# Patient Record
Sex: FEMALE | Race: WHITE | NOT HISPANIC OR LATINO | Employment: UNEMPLOYED | URBAN - METROPOLITAN AREA
[De-identification: names, ages, dates, MRNs, and addresses within clinical notes are randomized per-mention and may not be internally consistent; named-entity substitution may affect disease eponyms.]

---

## 2021-09-05 ENCOUNTER — HOSPITAL ENCOUNTER (EMERGENCY)
Facility: HOSPITAL | Age: 51
Discharge: HOME | End: 2021-09-05
Attending: EMERGENCY MEDICINE
Payer: COMMERCIAL

## 2021-09-05 ENCOUNTER — APPOINTMENT (EMERGENCY)
Dept: CT IMAGING | Facility: HOSPITAL | Age: 51
End: 2021-09-05
Payer: COMMERCIAL

## 2021-09-05 VITALS
OXYGEN SATURATION: 98 % | SYSTOLIC BLOOD PRESSURE: 203 MMHG | HEART RATE: 86 BPM | RESPIRATION RATE: 18 BRPM | DIASTOLIC BLOOD PRESSURE: 99 MMHG

## 2021-09-05 DIAGNOSIS — R51.9 ACUTE NONINTRACTABLE HEADACHE, UNSPECIFIED HEADACHE TYPE: Primary | ICD-10-CM

## 2021-09-05 DIAGNOSIS — K08.89 TOOTHACHE: ICD-10-CM

## 2021-09-05 PROCEDURE — 70450 CT HEAD/BRAIN W/O DYE: CPT

## 2021-09-05 PROCEDURE — 96375 TX/PRO/DX INJ NEW DRUG ADDON: CPT

## 2021-09-05 PROCEDURE — 99284 EMERGENCY DEPT VISIT MOD MDM: CPT | Performed by: PHYSICIAN ASSISTANT

## 2021-09-05 PROCEDURE — 99283 EMERGENCY DEPT VISIT LOW MDM: CPT

## 2021-09-05 PROCEDURE — 96374 THER/PROPH/DIAG INJ IV PUSH: CPT

## 2021-09-05 PROCEDURE — G1004 CDSM NDSC: HCPCS

## 2021-09-05 RX ORDER — DIPHENHYDRAMINE HYDROCHLORIDE 50 MG/ML
25 INJECTION INTRAMUSCULAR; INTRAVENOUS ONCE
Status: COMPLETED | OUTPATIENT
Start: 2021-09-05 | End: 2021-09-05

## 2021-09-05 RX ORDER — METOCLOPRAMIDE HYDROCHLORIDE 5 MG/ML
10 INJECTION INTRAMUSCULAR; INTRAVENOUS ONCE
Status: COMPLETED | OUTPATIENT
Start: 2021-09-05 | End: 2021-09-05

## 2021-09-05 RX ORDER — CLINDAMYCIN HYDROCHLORIDE 150 MG/1
450 CAPSULE ORAL EVERY 8 HOURS SCHEDULED
Qty: 63 CAPSULE | Refills: 0 | Status: SHIPPED | OUTPATIENT
Start: 2021-09-05 | End: 2021-09-12

## 2021-09-05 RX ORDER — KETOROLAC TROMETHAMINE 30 MG/ML
15 INJECTION, SOLUTION INTRAMUSCULAR; INTRAVENOUS ONCE
Status: COMPLETED | OUTPATIENT
Start: 2021-09-05 | End: 2021-09-05

## 2021-09-05 RX ORDER — NAPROXEN 500 MG/1
500 TABLET ORAL 2 TIMES DAILY WITH MEALS
Qty: 30 TABLET | Refills: 0 | Status: SHIPPED | OUTPATIENT
Start: 2021-09-05

## 2021-09-05 RX ADMIN — DIPHENHYDRAMINE HYDROCHLORIDE 25 MG: 50 INJECTION, SOLUTION INTRAMUSCULAR; INTRAVENOUS at 05:59

## 2021-09-05 RX ADMIN — METOCLOPRAMIDE HYDROCHLORIDE 10 MG: 5 INJECTION INTRAMUSCULAR; INTRAVENOUS at 06:04

## 2021-09-05 RX ADMIN — KETOROLAC TROMETHAMINE 15 MG: 30 INJECTION, SOLUTION INTRAMUSCULAR; INTRAVENOUS at 06:03

## 2021-09-05 NOTE — ED PROVIDER NOTES
History  Chief Complaint   Patient presents with    Headache     Pt presents to ED with left sided lower jaw, left ear and left sided head pain  Pt also reports 1 episode of vomitting today  Patient is a 57-year-old female with no significant past medical history presents the emergency department for evaluation of a left-sided headache that has been ongoing for the past 2-3 days  Patient also reporting 1 episode of vomiting this morning  Patient states that the pain initially began in the left side of her head, pain has since radiated to the left lower jaw as well as left ear  Patient does have history of migraines  Patient states that she has bad teeth and does not know if her symptoms are caused by a possible tooth infection  She does not have any vision changes, confusion, facial droop, dysarthria, unilateral weakness, numbness, tingling, abdominal pain, chest pain, difficulty breathing  No other complaints at this time  None       No past medical history on file  No past surgical history on file  No family history on file  I have reviewed and agree with the history as documented  E-Cigarette/Vaping     E-Cigarette/Vaping Substances     Social History     Tobacco Use    Smoking status: Never Smoker    Smokeless tobacco: Never Used   Substance Use Topics    Alcohol use: Not on file    Drug use: Not on file       Review of Systems   Constitutional: Negative for chills, diaphoresis and fever  HENT: Positive for ear pain (Left-sided)  Negative for congestion, facial swelling, nosebleeds, sore throat and voice change  Eyes: Negative for pain and redness  Respiratory: Negative for cough, choking, chest tightness, shortness of breath and stridor  Cardiovascular: Negative for chest pain and palpitations  Gastrointestinal: Positive for vomiting  Negative for abdominal pain, diarrhea and nausea     Genitourinary: Negative for difficulty urinating, dysuria, flank pain, hematuria, vaginal bleeding and vaginal discharge  Musculoskeletal: Negative for arthralgias, back pain, myalgias, neck pain and neck stiffness  Skin: Negative for color change and rash  Neurological: Positive for headaches  Negative for dizziness, syncope, facial asymmetry, weakness, light-headedness and numbness  Psychiatric/Behavioral: Negative for confusion and suicidal ideas  All other systems reviewed and are negative  Physical Exam  Physical Exam  Vitals reviewed  Constitutional:       General: She is not in acute distress  Appearance: Normal appearance  She is not ill-appearing, toxic-appearing or diaphoretic  HENT:      Head: Normocephalic and atraumatic  Right Ear: External ear normal       Left Ear: External ear normal       Nose: Nose normal  No congestion or rhinorrhea  Mouth/Throat:      Mouth: Mucous membranes are moist       Dentition: Abnormal dentition (Poor dentition, multiple missing teeth  Multiple dental caries  )  Dental caries present  Pharynx: Oropharynx is clear  No oropharyngeal exudate or posterior oropharyngeal erythema  Eyes:      General: No visual field deficit or scleral icterus  Right eye: No discharge  Left eye: No discharge  Extraocular Movements: Extraocular movements intact  Conjunctiva/sclera: Conjunctivae normal       Pupils: Pupils are equal, round, and reactive to light  Cardiovascular:      Rate and Rhythm: Normal rate and regular rhythm  Pulses: Normal pulses  Heart sounds: Normal heart sounds  No murmur heard  No friction rub  No gallop  Pulmonary:      Effort: Pulmonary effort is normal  No respiratory distress  Breath sounds: Normal breath sounds  No stridor  No wheezing, rhonchi or rales  Abdominal:      General: Abdomen is flat  Palpations: Abdomen is soft  Tenderness: There is no abdominal tenderness  There is no guarding or rebound     Musculoskeletal:      Cervical back: Normal range of motion and neck supple  Right lower leg: No edema  Left lower leg: No edema  Skin:     General: Skin is warm and dry  Capillary Refill: Capillary refill takes less than 2 seconds  Neurological:      General: No focal deficit present  Mental Status: She is alert and oriented to person, place, and time  GCS: GCS eye subscore is 4  GCS verbal subscore is 5  GCS motor subscore is 6  Cranial Nerves: Cranial nerves are intact  No cranial nerve deficit, dysarthria or facial asymmetry  Sensory: Sensation is intact  No sensory deficit  Motor: Motor function is intact  No weakness, tremor, seizure activity or pronator drift  Coordination: Coordination is intact  Coordination normal  Finger-Nose-Finger Test and Heel to [facility] Test normal  Rapid alternating movements normal       Gait: Gait is intact  Gait normal    Psychiatric:         Mood and Affect: Mood normal          Behavior: Behavior normal          Vital Signs  ED Triage Vitals   Temp Pulse Respirations Blood Pressure SpO2   -- 09/05/21 0535 09/05/21 0535 09/05/21 0535 09/05/21 0535    86 18 (!) 203/99 98 %      Temp src Heart Rate Source Patient Position - Orthostatic VS BP Location FiO2 (%)   -- 09/05/21 0535 09/05/21 0535 09/05/21 0535 --    Monitor Sitting Right arm       Pain Score       09/05/21 0603       6           Vitals:    09/05/21 0535   BP: (!) 203/99   Pulse: 86   Patient Position - Orthostatic VS: Sitting         Visual Acuity      ED Medications  Medications   metoclopramide (REGLAN) injection 10 mg (10 mg Intravenous Given 9/5/21 0604)   diphenhydrAMINE (BENADRYL) injection 25 mg (25 mg Intravenous Given 9/5/21 0559)   ketorolac (TORADOL) injection 15 mg (15 mg Intravenous Given 9/5/21 0603)       Diagnostic Studies  Results Reviewed     None                 CT head without contrast   Final Result by Adamaris Clements MD (09/05 4267)      No acute intracranial abnormality  Workstation performed: [id number]                    Procedures  Procedures         ED Course                                           MDM  Number of Diagnoses or Management Options  Acute nonintractable headache, unspecified headache type  Toothache  Diagnosis management comments: Patient is a 57-year-old female with no significant past medical history presents the emergency department for evaluation of a left-sided headache that has been ongoing for the past 2-3 days  Patient also reporting 1 episode of vomiting this morning  Patient states that the pain initially began in the left side of her head, pain has since radiated to the left lower jaw as well as left ear  Patient does have history of migraines  Patient states that she has bad teeth and does not know if her symptoms are caused by a possible tooth infection  She does not have any vision changes, confusion, facial droop, dysarthria, unilateral weakness, numbness, tingling, abdominal pain, chest pain, difficulty breathing  No other complaints at this time  Patient appears comfortable in bed  She is not any acute distress  Vital signs remarkable for hypertension at 203/99  Remainder of vital signs unremarkable  Physical exam remarkable for poor dentition with multiple missing teeth and multiple dental caries  There is also dental tenderness present  Remainder of physical exam unremarkable  Heart is regular rate and rhythm  Lungs are clear to auscultation bilaterally  Abdomen is soft and nontender  Neurologic exam benign  CT head did not reveal any acute intracranial abnormality  Patient felt much improved after migraine cocktail  Patient discharged home with instructions to follow-up with her primary care provider  She was also given prescription for clindamycin for possible tooth infection  Patient given prescription for naproxen  She was given very strict instructions on when to return to the emergency department    Patient stable at this time  Amount and/or Complexity of Data Reviewed  Tests in the radiology section of CPT®: ordered and reviewed    Patient Progress  Patient progress: stable      Disposition  Final diagnoses:   Acute nonintractable headache, unspecified headache type   Toothache     Time reflects when diagnosis was documented in both MDM as applicable and the Disposition within this note     Time User Action Codes Description Comment    9/5/2021  6:53 AM Briana Haqueter Add [R51 9] Acute nonintractable headache, unspecified headache type     9/5/2021  6:53 AM Briana Cleve Add [K08 89] Toothache       ED Disposition     ED Disposition Condition Date/Time Comment    Discharge Stable Sun Sep 5, 2021  6:53 AM Jarvis Mancilla discharge to home/self care  Follow-up Information     Follow up With Specialties Details Why Contact Info Additional [address] [facility] Emergency Department Emergency Medicine Go to  If symptoms worsen [address] Emergency Department, [address] [place] [address], [address] [facility] Adult and [address] [address]  Schedule an appointment as soon as possible for a visit  for follow up Charmaine Rader 118  [phone]           Discharge Medication List as of 9/5/2021  6:56 AM      START taking these medications    Details   clindamycin (CLEOCIN) 150 mg capsule Take 3 capsules (450 mg total) by mouth every 8 (eight) hours for 7 days, Starting Sun 9/5/2021, Until Sun 9/12/2021, Print      naproxen (NAPROSYN) 500 mg tablet Take 1 tablet (500 mg total) by mouth 2 (two) times a day with meals, Starting Sun 9/5/2021, Print           No discharge procedures on file      PDMP Review     None          ED Provider  Electronically Signed by           Darrius Lyn PA-C  09/05/21 6150

## 2022-10-25 ENCOUNTER — HOSPITAL ENCOUNTER (EMERGENCY)
Facility: HOSPITAL | Age: 52
Discharge: HOME | End: 2022-10-25
Attending: EMERGENCY MEDICINE
Payer: COMMERCIAL

## 2022-10-25 VITALS
WEIGHT: 200 LBS | RESPIRATION RATE: 22 BRPM | BODY MASS INDEX: 32.14 KG/M2 | HEART RATE: 67 BPM | SYSTOLIC BLOOD PRESSURE: 126 MMHG | DIASTOLIC BLOOD PRESSURE: 72 MMHG | HEIGHT: 66 IN | OXYGEN SATURATION: 99 % | TEMPERATURE: 98 F

## 2022-10-25 DIAGNOSIS — R53.81 MALAISE: Primary | ICD-10-CM

## 2022-10-25 LAB
ANION GAP SERPL CALCULATED.3IONS-SCNC: 10 MMOL/L (ref 4–13)
ATRIAL RATE: 64 BPM
BASOPHILS # BLD AUTO: 0.04 THOUSANDS/ÂΜL (ref 0–0.1)
BASOPHILS NFR BLD AUTO: 0 % (ref 0–1)
BUN SERPL-MCNC: 15 MG/DL (ref 5–25)
CALCIUM SERPL-MCNC: 9.3 MG/DL (ref 8.3–10.1)
CARDIAC TROPONIN I PNL SERPL HS: 3 NG/L
CHLORIDE SERPL-SCNC: 105 MMOL/L (ref 96–108)
CO2 SERPL-SCNC: 25 MMOL/L (ref 21–32)
CREAT SERPL-MCNC: 0.92 MG/DL (ref 0.6–1.3)
EOSINOPHIL # BLD AUTO: 0.06 THOUSAND/ÂΜL (ref 0–0.61)
EOSINOPHIL NFR BLD AUTO: 1 % (ref 0–6)
ERYTHROCYTE [DISTWIDTH] IN BLOOD BY AUTOMATED COUNT: 13.4 % (ref 11.6–15.1)
FLUAV RNA RESP QL NAA+PROBE: NEGATIVE
FLUBV RNA RESP QL NAA+PROBE: NEGATIVE
GFR SERPL CREATININE-BSD FRML MDRD: 71 ML/MIN/1.73SQ M
GLUCOSE SERPL-MCNC: 126 MG/DL (ref 65–140)
HCT VFR BLD AUTO: 42.6 % (ref 34.8–46.1)
HGB BLD-MCNC: 14.1 G/DL (ref 11.5–15.4)
IMM GRANULOCYTES # BLD AUTO: 0.03 THOUSAND/UL (ref 0–0.2)
IMM GRANULOCYTES NFR BLD AUTO: 0 % (ref 0–2)
LYMPHOCYTES # BLD AUTO: 2.96 THOUSANDS/ÂΜL (ref 0.6–4.47)
LYMPHOCYTES NFR BLD AUTO: 23 % (ref 14–44)
MAGNESIUM SERPL-MCNC: 2 MG/DL (ref 1.6–2.6)
MCH RBC QN AUTO: 30.4 PG (ref 26.8–34.3)
MCHC RBC AUTO-ENTMCNC: 33.1 G/DL (ref 31.4–37.4)
MCV RBC AUTO: 92 FL (ref 82–98)
MONOCYTES # BLD AUTO: 0.52 THOUSAND/ÂΜL (ref 0.17–1.22)
MONOCYTES NFR BLD AUTO: 4 % (ref 4–12)
NEUTROPHILS # BLD AUTO: 9.17 THOUSANDS/ÂΜL (ref 1.85–7.62)
NEUTS SEG NFR BLD AUTO: 72 % (ref 43–75)
NRBC BLD AUTO-RTO: 0 /100 WBCS
P AXIS: 65 DEGREES
PLATELET # BLD AUTO: 350 THOUSANDS/UL (ref 149–390)
PMV BLD AUTO: 9.1 FL (ref 8.9–12.7)
POTASSIUM SERPL-SCNC: 4.3 MMOL/L (ref 3.5–5.3)
PR INTERVAL: 174 MS
QRS AXIS: 75 DEGREES
QRSD INTERVAL: 88 MS
QT INTERVAL: 392 MS
QTC INTERVAL: 404 MS
RBC # BLD AUTO: 4.64 MILLION/UL (ref 3.81–5.12)
RSV RNA RESP QL NAA+PROBE: NEGATIVE
SARS-COV-2 RNA RESP QL NAA+PROBE: NEGATIVE
SODIUM SERPL-SCNC: 140 MMOL/L (ref 135–147)
T WAVE AXIS: 74 DEGREES
VENTRICULAR RATE: 64 BPM
WBC # BLD AUTO: 12.78 THOUSAND/UL (ref 4.31–10.16)

## 2022-10-25 PROCEDURE — 0241U HB NFCT DS VIR RESP RNA 4 TRGT: CPT | Performed by: SURGERY

## 2022-10-25 PROCEDURE — 85025 COMPLETE CBC W/AUTO DIFF WBC: CPT | Performed by: SURGERY

## 2022-10-25 PROCEDURE — 36415 COLL VENOUS BLD VENIPUNCTURE: CPT | Performed by: SURGERY

## 2022-10-25 PROCEDURE — 93010 ELECTROCARDIOGRAM REPORT: CPT | Performed by: INTERNAL MEDICINE

## 2022-10-25 PROCEDURE — 93005 ELECTROCARDIOGRAM TRACING: CPT

## 2022-10-25 PROCEDURE — 80048 BASIC METABOLIC PNL TOTAL CA: CPT | Performed by: SURGERY

## 2022-10-25 PROCEDURE — 83735 ASSAY OF MAGNESIUM: CPT | Performed by: SURGERY

## 2022-10-25 PROCEDURE — 84484 ASSAY OF TROPONIN QUANT: CPT | Performed by: SURGERY

## 2022-10-25 PROCEDURE — 99284 EMERGENCY DEPT VISIT MOD MDM: CPT | Performed by: SURGERY

## 2022-10-25 RX ORDER — SODIUM CHLORIDE 9 MG/ML
3 INJECTION INTRAVENOUS
Status: DISCONTINUED | OUTPATIENT
Start: 2022-10-25 | End: 2022-10-25 | Stop reason: HOSPADM

## 2022-10-25 RX ADMIN — SODIUM CHLORIDE 1000 ML: 0.9 INJECTION, SOLUTION INTRAVENOUS at 03:02

## 2022-10-25 NOTE — ED PROVIDER NOTES
History  Chief Complaint   Patient presents with   • Weakness - Generalized     Pt state's she "doesn't feel great", with arm/leg pain along with shakiness  Pt states daughter took BP at home and it was 146/100  Maurice Schilling is a 46 y o  female with no pertinent past medical history presenting today with malaise  Patient reports that she woke up feeling malaise and generalized fatigue   Patient daughter took blood pressure which was 146/100 prompting the patient to become increasingly anxious and presents to the emergency department for evaluation  Patient has never had symptoms like this in the past   She denies any history of hypertension is not currently on any medications  Denies any chest pain, shortness of breath, lightheadedness, dizziness, nausea, vomiting, diarrhea, fevers, chills, numbness, tingling, weakness in the extremities  No further complaints at this time            Prior to Admission Medications   Prescriptions Last Dose Informant Patient Reported? Taking?   naproxen (NAPROSYN) 500 mg tablet   No No   Sig: Take 1 tablet (500 mg total) by mouth 2 (two) times a day with meals      Facility-Administered Medications: None       History reviewed  No pertinent past medical history  History reviewed  No pertinent surgical history  History reviewed  No pertinent family history  I have reviewed and agree with the history as documented  E-Cigarette/Vaping     E-Cigarette/Vaping Substances     Social History     Tobacco Use   • Smoking status: Never Smoker   • Smokeless tobacco: Never Used       Review of Systems   Constitutional: Positive for fatigue  Negative for chills and fever  HENT: Negative for ear pain and sore throat  Eyes: Negative for pain and visual disturbance  Respiratory: Negative for cough and shortness of breath  Cardiovascular: Negative for chest pain and palpitations  Gastrointestinal: Negative for abdominal pain and vomiting     Genitourinary: Negative for dysuria and hematuria  Musculoskeletal: Negative for arthralgias and back pain  Skin: Negative for color change and rash  Neurological: Negative for seizures, syncope and weakness (Triage note reported weakness but patient symptoms consistent with fatigue  )  All other systems reviewed and are negative  Physical Exam  Physical Exam  Constitutional:       Appearance: She is normal weight  HENT:      Head: Normocephalic and atraumatic  Right Ear: External ear normal       Left Ear: External ear normal       Nose: Nose normal  No congestion or rhinorrhea  Mouth/Throat:      Mouth: Mucous membranes are moist       Pharynx: Oropharynx is clear  No oropharyngeal exudate or posterior oropharyngeal erythema  Eyes:      Pupils: Pupils are equal, round, and reactive to light  Cardiovascular:      Rate and Rhythm: Normal rate and regular rhythm  Pulmonary:      Effort: Pulmonary effort is normal  No respiratory distress  Breath sounds: Normal breath sounds  No wheezing or rhonchi  Abdominal:      General: Abdomen is flat  Bowel sounds are normal  There is no distension  Tenderness: There is no abdominal tenderness  Musculoskeletal:         General: No swelling or tenderness  Normal range of motion  Cervical back: Normal range of motion and neck supple  No rigidity or tenderness  Skin:     General: Skin is warm  Capillary Refill: Capillary refill takes less than 2 seconds  Neurological:      General: No focal deficit present  Mental Status: She is alert and oriented to person, place, and time           Vital Signs  ED Triage Vitals [10/25/22 0221]   Temperature Pulse Respirations Blood Pressure SpO2   98 °F (36 7 °C) 78 18 (!) 190/103 97 %      Temp Source Heart Rate Source Patient Position - Orthostatic VS BP Location FiO2 (%)   Oral Monitor Sitting Left arm --      Pain Score       --           Vitals:    10/25/22 0221 10/25/22 0230 10/25/22 0330 10/25/22 0430 BP: (!) 190/103 154/83 160/75 126/72   Pulse: 78 65 64 67   Patient Position - Orthostatic VS: Sitting Sitting Lying Lying         Visual Acuity  Visual Acuity    Flowsheet Row Most Recent Value   L Pupil Size (mm) 3   R Pupil Size (mm) 3          ED Medications  Medications   sodium chloride (PF) 0 9 % injection 3 mL (has no administration in time range)   sodium chloride 0 9 % bolus 1,000 mL (0 mL Intravenous Stopped 10/25/22 0522)       Diagnostic Studies  Results Reviewed     Procedure Component Value Units Date/Time    FLU/RSV/COVID - if FLU/RSV clinically relevant [[id number]]  (Normal) Collected: 10/25/22 0351    Lab Status: Final result Specimen: Nasopharyngeal Swab Updated: 10/25/22 0445     SARS-CoV-2 Negative     INFLUENZA A PCR Negative     INFLUENZA B PCR Negative     RSV PCR Negative    Narrative:      FOR PEDIATRIC PATIENTS - copy/paste COVID Guidelines URL to browser: [email or web address]  [employer]    SARS-CoV-2 assay is a Nucleic Acid Amplification assay intended for the  qualitative detection of nucleic acid from SARS-CoV-2 in nasopharyngeal  swabs  Results are for the presumptive identification of SARS-CoV-2 RNA  Positive results are indicative of infection with SARS-CoV-2, the virus  causing COVID-19, but do not rule out bacterial infection or co-infection  with other viruses  Laboratories within the [place] and its  territories are required to report all positive results to the appropriate  public health authorities  Negative results do not preclude SARS-CoV-2  infection and should not be used as the sole basis for treatment or other  patient management decisions  Negative results must be combined with  clinical observations, patient history, and epidemiological information  This test has not been FDA cleared or approved  This test has been authorized by FDA under an Emergency Use Authorization  (EUA)   This test is only authorized for the duration of time the  declaration that circumstances exist justifying the authorization of the  emergency use of an in vitro diagnostic tests for detection of SARS-CoV-2  virus and/or diagnosis of COVID-19 infection under section 564(b)(1) of  the Act, 21 U  S C  566BRR-8(W)(0), unless the authorization is terminated  or revoked sooner  The test has been validated but independent review by FDA  and CLIA is pending  Test performed using [employer] GeneXpert: This RT-PCR assay targets N2,  a region unique to SARS-CoV-2  A conserved region in the E-gene was chosen  for pan-Sarbecovirus detection which includes SARS-CoV-2  According to CMS-2020-01-R, this platform meets the definition of high-throughput technology      HS Troponin I 4hr [[id number]]     Lab Status: No result Specimen: Blood     HS Troponin I 2hr [[id number]]     Lab Status: No result Specimen: Blood     HS Troponin 0hr (reflex protocol) [[id number]]  (Normal) Collected: 10/25/22 0301    Lab Status: Final result Specimen: Blood from Arm, Right Updated: 10/25/22 0336     hs TnI 0hr 3 ng/L     Basic metabolic panel [[id number]] Collected: 10/25/22 0301    Lab Status: Final result Specimen: Blood from Arm, Right Updated: 10/25/22 0323     Sodium 140 mmol/L      Potassium 4 3 mmol/L      Chloride 105 mmol/L      CO2 25 mmol/L      ANION GAP 10 mmol/L      BUN 15 mg/dL      Creatinine 0 92 mg/dL      Glucose 126 mg/dL      Calcium 9 3 mg/dL      eGFR 71 ml/min/1 73sq m     Narrative:      Asiya guidelines for Chronic Kidney Disease (CKD):   •  Stage 1 with normal or high GFR (GFR > 90 mL/min/1 73 square meters)  •  Stage 2 Mild CKD (GFR = 60-89 mL/min/1 73 square meters)  •  Stage 3A Moderate CKD (GFR = 45-59 mL/min/1 73 square meters)  •  Stage 3B Moderate CKD (GFR = 30-44 mL/min/1 73 square meters)  •  Stage 4 Severe CKD (GFR = 15-29 mL/min/1 73 square meters)  •  Stage 5 End Stage CKD (GFR <15 mL/min/1 73 square meters)  Note: GFR calculation is accurate only with a steady state creatinine    Magnesium [[id number]]  (Normal) Collected: 10/25/22 0301    Lab Status: Final result Specimen: Blood from Arm, Right Updated: 10/25/22 0323     Magnesium 2 0 mg/dL     CBC and differential [[id number]]  (Abnormal) Collected: 10/25/22 0301    Lab Status: Final result Specimen: Blood from Arm, Right Updated: 10/25/22 0308     WBC 12 78 Thousand/uL      RBC 4 64 Million/uL      Hemoglobin 14 1 g/dL      Hematocrit 42 6 %      MCV 92 fL      MCH 30 4 pg      MCHC 33 1 g/dL      RDW 13 4 %      MPV 9 1 fL      Platelets 369 Thousands/uL      nRBC 0 /100 WBCs      Neutrophils Relative 72 %      Immat GRANS % 0 %      Lymphocytes Relative 23 %      Monocytes Relative 4 %      Eosinophils Relative 1 %      Basophils Relative 0 %      Neutrophils Absolute 9 17 Thousands/µL      Immature Grans Absolute 0 03 Thousand/uL      Lymphocytes Absolute 2 96 Thousands/µL      Monocytes Absolute 0 52 Thousand/µL      Eosinophils Absolute 0 06 Thousand/µL      Basophils Absolute 0 04 Thousands/µL                  No orders to display              Procedures  Procedures         ED Course             HEART Risk Score    Flowsheet Row Most Recent Value   Heart Score Risk Calculator    History 1 Filed at: 10/25/2022 0500   ECG 0 Filed at: 10/25/2022 0500   Age 1 Filed at: 10/25/2022 0500   Risk Factors 1 Filed at: 10/25/2022 0500   Troponin 0 Filed at: 10/25/2022 0500   HEART Score 3 Filed at: 10/25/2022 0500                                      MDM  Number of Diagnoses or Management Options  Malaise: minor  Diagnosis management comments: Cassi Nicholson is a 46 y o  female with no pertinent past medical history presenting today with malaise  Patient feels significantly improved after IV fluids  Blood pressure reduced to 126/72  Patient remained afebrile  Vital signs stable  Discussed findings of lab work with elevated white count  COVID negative    Patient requesting to go home   Strict return criteria were discussed with the patient at bedside  She demonstrated understanding  Amount and/or Complexity of Data Reviewed  Clinical lab tests: ordered and reviewed  Tests in the radiology section of CPT®: ordered and reviewed  Tests in the medicine section of CPT®: reviewed and ordered    Risk of Complications, Morbidity, and/or Mortality  Presenting problems: moderate  Diagnostic procedures: moderate  Management options: moderate    Patient Progress  Patient progress: stable      Disposition  Final diagnoses:   Malaise     Time reflects when diagnosis was documented in both MDM as applicable and the Disposition within this note     Time User Action Codes Description Comment    10/25/2022  5:00 AM Fred Acuña Add [R53 81] [facility]       ED Disposition     ED Disposition   Discharge    Condition   Stable    Date/Time   Tue Oct 25, 2022  5:00 AM    Comment   Haider Morgan discharge to home/self care  Follow-up Information     Follow up With Specialties Details Why Contact Info Additional [address] [facility] Emergency Department Emergency Medicine Go to  If symptoms worsen [address] Emergency Department, [address], [address] [place] [address]  Call today To schedule an appointment for follow-up with a primary care provider within the next 1 week regarding elevated blood pressure  [phone]             Discharge Medication List as of 10/25/2022  5:26 AM      CONTINUE these medications which have NOT CHANGED    Details   naproxen (NAPROSYN) 500 mg tablet Take 1 tablet (500 mg total) by mouth 2 (two) times a day with meals, Starting Sun 9/5/2021, Print             No discharge procedures on file      PDMP Review     None          ED Provider  Electronically Signed by           Amira Arauz PA-C  10/25/22 8756

## 2022-10-26 ENCOUNTER — APPOINTMENT (OUTPATIENT)
Dept: LAB | Facility: CLINIC | Age: 52
End: 2022-10-26

## 2022-10-26 ENCOUNTER — OFFICE VISIT (OUTPATIENT)
Dept: FAMILY MEDICINE CLINIC | Facility: CLINIC | Age: 52
End: 2022-10-26
Payer: COMMERCIAL

## 2022-10-26 VITALS
SYSTOLIC BLOOD PRESSURE: 148 MMHG | HEART RATE: 87 BPM | WEIGHT: 195.2 LBS | DIASTOLIC BLOOD PRESSURE: 106 MMHG | HEIGHT: 66 IN | TEMPERATURE: 97.8 F | BODY MASS INDEX: 31.37 KG/M2 | OXYGEN SATURATION: 95 %

## 2022-10-26 DIAGNOSIS — Z12.11 SCREENING FOR COLON CANCER: ICD-10-CM

## 2022-10-26 DIAGNOSIS — Z23 FLU VACCINE NEED: ICD-10-CM

## 2022-10-26 DIAGNOSIS — R73.09 ELEVATED GLUCOSE: ICD-10-CM

## 2022-10-26 DIAGNOSIS — Z13.220 SCREENING, LIPID: ICD-10-CM

## 2022-10-26 DIAGNOSIS — I10 PRIMARY HYPERTENSION: Primary | ICD-10-CM

## 2022-10-26 DIAGNOSIS — I10 PRIMARY HYPERTENSION: ICD-10-CM

## 2022-10-26 DIAGNOSIS — Z76.89 ENCOUNTER TO ESTABLISH CARE: ICD-10-CM

## 2022-10-26 DIAGNOSIS — Z12.31 SCREENING MAMMOGRAM FOR BREAST CANCER: ICD-10-CM

## 2022-10-26 DIAGNOSIS — E66.09 CLASS 1 OBESITY DUE TO EXCESS CALORIES WITH SERIOUS COMORBIDITY AND BODY MASS INDEX (BMI) OF 31.0 TO 31.9 IN ADULT: ICD-10-CM

## 2022-10-26 PROBLEM — E66.811 CLASS 1 OBESITY DUE TO EXCESS CALORIES WITH SERIOUS COMORBIDITY AND BODY MASS INDEX (BMI) OF 31.0 TO 31.9 IN ADULT: Status: ACTIVE | Noted: 2022-10-26

## 2022-10-26 PROBLEM — G40.909 NONINTRACTABLE EPILEPSY WITHOUT STATUS EPILEPTICUS, UNSPECIFIED EPILEPSY TYPE (HCC): Status: RESOLVED | Noted: 2022-10-26 | Resolved: 2022-10-26

## 2022-10-26 LAB
CHOLEST SERPL-MCNC: 273 MG/DL
EST. AVERAGE GLUCOSE BLD GHB EST-MCNC: 120 MG/DL
HBA1C MFR BLD: 5.8 %
HDLC SERPL-MCNC: 51 MG/DL
LDLC SERPL CALC-MCNC: 187 MG/DL (ref 0–100)
TRIGL SERPL-MCNC: 173 MG/DL

## 2022-10-26 PROCEDURE — 90471 IMMUNIZATION ADMIN: CPT | Performed by: FAMILY MEDICINE

## 2022-10-26 PROCEDURE — 99204 OFFICE O/P NEW MOD 45 MIN: CPT | Performed by: FAMILY MEDICINE

## 2022-10-26 PROCEDURE — 90682 RIV4 VACC RECOMBINANT DNA IM: CPT | Performed by: FAMILY MEDICINE

## 2022-10-26 RX ORDER — HYDROCHLOROTHIAZIDE 25 MG/1
25 TABLET ORAL DAILY
Qty: 30 TABLET | Refills: 2 | Status: SHIPPED | OUTPATIENT
Start: 2022-10-26

## 2022-10-26 NOTE — PROGRESS NOTES
Assessment/Plan:         Problem List Items Addressed This Visit        Cardiovascular and Mediastinum    Primary hypertension - Primary     Will start HCTZ 25 mg daily  Discussed common side effects  Will monitor with home BP cuff and return in 2 weeks for recheck  Relevant Medications    hydrochlorothiazide (HYDRODIURIL) 25 mg tablet    Other Relevant Orders    Lipid Panel with Direct LDL reflex       Other    Class 1 obesity due to excess calories with serious comorbidity and body mass index (BMI) of 31 0 to 31 9 in adult     BMI Counseling: Body mass index is 31 51 kg/m²  The BMI is above normal  Nutrition recommendations include decreasing overall calorie intake  Exercise recommendations include exercising 3-5 times per week  Other Visit Diagnoses     Flu vaccine need        Relevant Orders    FLUBLOK: influenza vaccine, quadrivalent, recombinant, PF, 0 5 mL (Completed)    Screening mammogram for breast cancer        Relevant Orders    Mammo screening bilateral w 3d & cad    Screening for colon cancer        Relevant Orders    Cologuard    Encounter to establish care        Elevated glucose        Relevant Orders    HEMOGLOBIN A1C W/ EAG ESTIMATION    Screening, lipid        Relevant Orders    Lipid Panel with Direct LDL reflex            Subjective:      Patient ID: João Calvo is a 46 y o  female  46year old here to establish care  No significant PMH  She was recently in the ER for hypertension  At home 155/110  At the /103  She has no known history of hypertension  For me her /110  She denies chest pain, SOB, headaches  She had labs done in the ER which we reviewed  Glucose 126, not fasting  Obesity - no exercise, diet is normal per patient  Hypertension  This is a new problem  The current episode started today  The problem is unchanged  The problem is resistant  Associated symptoms include orthopnea   Pertinent negatives include no blurred vision, chest pain, headaches, palpitations, peripheral edema, PND or shortness of breath  There are no associated agents to hypertension  Risk factors for coronary artery disease include family history, obesity, post-menopausal state and smoking/tobacco exposure  Compliance problems include exercise  The following portions of the patient's history were reviewed and updated as appropriate:   Past Medical History:  She has no past medical history on file ,  _______________________________________________________________________  Medical Problems:  does not have any pertinent problems on file ,  _______________________________________________________________________  Past Surgical History:   has no past surgical history on file ,  _______________________________________________________________________  Family History:  family history includes COPD in her father; Heart disease in her father; Hypertension in her mother ,  _______________________________________________________________________  Social History:   reports that she quit smoking about 13 years ago  Her smoking use included cigarettes  She has a 25 00 pack-year smoking history  She has never used smokeless tobacco  She reports current alcohol use  She reports previous drug use ,  _______________________________________________________________________  Allergies:  is allergic to penicillins and erythromycin     _______________________________________________________________________  Current Outpatient Medications   Medication Sig Dispense Refill   • hydrochlorothiazide (HYDRODIURIL) 25 mg tablet Take 1 tablet (25 mg total) by mouth daily 30 tablet 2   • naproxen (NAPROSYN) 500 mg tablet Take 1 tablet (500 mg total) by mouth 2 (two) times a day with meals (Patient taking differently: Take 500 mg by mouth 2 (two) times a day with meals As needed) 30 tablet 0     No current facility-administered medications for this visit  _______________________________________________________________________  Review of Systems   Constitutional: Negative for activity change, appetite change, chills, fatigue, fever and unexpected weight change  HENT: Negative for congestion, ear discharge, ear pain, postnasal drip, sinus pressure and sore throat  Eyes: Negative for blurred vision, discharge and visual disturbance  Respiratory: Negative for cough, shortness of breath and wheezing  Cardiovascular: Positive for orthopnea  Negative for chest pain, palpitations, leg swelling and PND  Gastrointestinal: Negative for abdominal pain, constipation, diarrhea, nausea and vomiting  Endocrine: Negative for cold intolerance, heat intolerance, polydipsia and polyuria  Genitourinary: Negative for difficulty urinating and frequency  Musculoskeletal: Negative for arthralgias, back pain, joint swelling and myalgias  Skin: Negative for rash  Neurological: Negative for dizziness, weakness, light-headedness, numbness and headaches  Hematological: Negative for adenopathy  Psychiatric/Behavioral: Negative for behavioral problems, confusion, dysphoric mood, sleep disturbance and suicidal ideas  The patient is not nervous/anxious  Objective:  Vitals:    10/26/22 1226   BP: (!) 148/106   BP Location: Left arm   Patient Position: Sitting   Pulse: 87   Temp: 97 8 °F (36 6 °C)   TempSrc: Temporal   SpO2: 95%   Weight: 88 5 kg (195 lb 3 2 oz)   Height: 5' 6" (1 676 m)     Body mass index is 31 51 kg/m²  Physical Exam  Vitals and nursing note reviewed  Constitutional:       General: She is not in acute distress  Appearance: Normal appearance  She is obese  She is not ill-appearing, toxic-appearing or diaphoretic  HENT:      Head: Normocephalic and atraumatic  Right Ear: External ear normal       Left Ear: External ear normal    Cardiovascular:      Rate and Rhythm: Normal rate and regular rhythm        Heart sounds: No murmur heard     No friction rub  Pulmonary:      Effort: Pulmonary effort is normal  No respiratory distress  Breath sounds: Normal breath sounds  No stridor  No wheezing, rhonchi or rales  Musculoskeletal:         General: No swelling  Right lower leg: No edema  Left lower leg: No edema  Neurological:      General: No focal deficit present  Mental Status: She is alert  Mental status is at baseline  Psychiatric:         Attention and Perception: Attention normal          Mood and Affect: Mood normal          Speech: Speech normal          Behavior: Behavior normal          Thought Content: Thought content normal          Judgment: Judgment normal            BMI Counseling: Body mass index is 31 51 kg/m²  The BMI is above normal  Nutrition recommendations include decreasing overall calorie intake  Exercise recommendations include exercising 3-5 times per week

## 2022-10-26 NOTE — ASSESSMENT & PLAN NOTE
BMI Counseling: Body mass index is 31 51 kg/m²  The BMI is above normal  Nutrition recommendations include decreasing overall calorie intake  Exercise recommendations include exercising 3-5 times per week

## 2022-10-26 NOTE — ASSESSMENT & PLAN NOTE
Will start HCTZ 25 mg daily  Discussed common side effects  Will monitor with home BP cuff and return in 2 weeks for recheck

## 2022-11-10 ENCOUNTER — OFFICE VISIT (OUTPATIENT)
Dept: FAMILY MEDICINE CLINIC | Facility: CLINIC | Age: 52
End: 2022-11-10

## 2022-11-10 VITALS
SYSTOLIC BLOOD PRESSURE: 140 MMHG | HEIGHT: 66 IN | OXYGEN SATURATION: 98 % | WEIGHT: 195 LBS | DIASTOLIC BLOOD PRESSURE: 80 MMHG | BODY MASS INDEX: 31.34 KG/M2 | HEART RATE: 74 BPM | TEMPERATURE: 97.6 F

## 2022-11-10 DIAGNOSIS — E78.2 MIXED HYPERLIPIDEMIA: ICD-10-CM

## 2022-11-10 DIAGNOSIS — I10 PRIMARY HYPERTENSION: Primary | ICD-10-CM

## 2022-11-10 DIAGNOSIS — R73.03 PREDIABETES: ICD-10-CM

## 2022-11-10 RX ORDER — LISINOPRIL 10 MG/1
10 TABLET ORAL DAILY
Qty: 30 TABLET | Refills: 2 | Status: SHIPPED | OUTPATIENT
Start: 2022-11-10

## 2022-11-10 NOTE — PROGRESS NOTES
Assessment/Plan:         Problem List Items Addressed This Visit        Cardiovascular and Mediastinum    Primary hypertension - Primary     BP still elevated- will add Lisinopril 10 mg, continue HCTZ  Monitor at home  Relevant Medications    lisinopril (ZESTRIL) 10 mg tablet       Other    Mixed hyperlipidemia     Reduce fats in diet, will repeat in 3 months         Relevant Orders    Lipid Panel with Direct LDL reflex    Comprehensive metabolic panel    Prediabetes     Work on low carb diet, exercise, weight loss         Relevant Orders    Hemoglobin A1C            Subjective:      Patient ID: Piter Muniz is a 46 y o  female  Here for a BP check  She was started on HCTZ 25 mg daily  Had some lightheadedness at first, but now tolerating well  BP still elevated- 140/90s  Also had labs done which we reveiwed  Her cholesterol is elevated  A1c 5 8% prediabetic range  She admits her diet could be better  She plans to work on diet and exercise  The following portions of the patient's history were reviewed and updated as appropriate:    Past Medical History:  She has no past medical history on file ,  _______________________________________________________________________  Medical Problems:  does not have any pertinent problems on file ,  _______________________________________________________________________  Past Surgical History:   has no past surgical history on file ,  _______________________________________________________________________  Family History:  family history includes COPD in her father; Heart disease in her father; Hypertension in her mother ,  _______________________________________________________________________  Social History:   reports that she quit smoking about 13 years ago  Her smoking use included cigarettes  She has a 25 00 pack-year smoking history  She has never used smokeless tobacco  She reports current alcohol use   She reports previous drug use ,  _______________________________________________________________________  Allergies:  is allergic to penicillins, erythromycin, and salicylates     _______________________________________________________________________  Current Outpatient Medications   Medication Sig Dispense Refill   • lisinopril (ZESTRIL) 10 mg tablet Take 1 tablet (10 mg total) by mouth daily 30 tablet 2   • hydrochlorothiazide (HYDRODIURIL) 25 mg tablet Take 1 tablet (25 mg total) by mouth daily 30 tablet 2   • naproxen (NAPROSYN) 500 mg tablet Take 1 tablet (500 mg total) by mouth 2 (two) times a day with meals (Patient taking differently: Take 500 mg by mouth 2 (two) times a day with meals As needed) 30 tablet 0     No current facility-administered medications for this visit      _______________________________________________________________________  Review of Systems   Constitutional: Negative for activity change  Respiratory: Negative for chest tightness and shortness of breath  Cardiovascular: Negative for chest pain and leg swelling  Neurological: Negative for headaches  Psychiatric/Behavioral: Negative for dysphoric mood  The patient is not nervous/anxious  Objective:  Vitals:    11/10/22 1301   BP: 140/80   BP Location: Left arm   Patient Position: Sitting   Cuff Size: Large   Pulse: 74   Temp: 97 6 °F (36 4 °C)   SpO2: 98%   Weight: 88 5 kg (195 lb)   Height: 5' 6" (1 676 m)     Body mass index is 31 47 kg/m²  Physical Exam  Vitals and nursing note reviewed  Constitutional:       General: She is not in acute distress  Appearance: Normal appearance  She is obese  She is not ill-appearing, toxic-appearing or diaphoretic  HENT:      Head: Normocephalic and atraumatic  Right Ear: External ear normal       Left Ear: External ear normal    Cardiovascular:      Rate and Rhythm: Normal rate and regular rhythm  Heart sounds: No murmur heard  No friction rub     Pulmonary:      Effort: Pulmonary effort is normal  No respiratory distress  Breath sounds: Normal breath sounds  No stridor  No wheezing, rhonchi or rales  Musculoskeletal:         General: No swelling  Right lower leg: No edema  Left lower leg: No edema  Neurological:      General: No focal deficit present  Mental Status: She is alert  Mental status is at baseline  Psychiatric:         Attention and Perception: Attention normal          Mood and Affect: Mood normal          Speech: Speech normal          Behavior: Behavior normal          Thought Content:  Thought content normal          Judgment: Judgment normal

## 2023-01-05 ENCOUNTER — OFFICE VISIT (OUTPATIENT)
Dept: FAMILY MEDICINE CLINIC | Facility: CLINIC | Age: 53
End: 2023-01-05

## 2023-01-05 VITALS
WEIGHT: 198 LBS | OXYGEN SATURATION: 97 % | HEART RATE: 72 BPM | HEIGHT: 66 IN | DIASTOLIC BLOOD PRESSURE: 84 MMHG | BODY MASS INDEX: 31.82 KG/M2 | SYSTOLIC BLOOD PRESSURE: 138 MMHG | TEMPERATURE: 97.8 F

## 2023-01-05 DIAGNOSIS — Z01.419 WELL WOMAN EXAM WITH ROUTINE GYNECOLOGICAL EXAM: ICD-10-CM

## 2023-01-05 DIAGNOSIS — Z00.00 ANNUAL PHYSICAL EXAM: Primary | ICD-10-CM

## 2023-01-05 NOTE — PROGRESS NOTES
[id number] [address] [place] [address]    NAME: Kelsey Al  AGE: 45 y o  SEX: female  : 1970     DATE: 2023     Assessment and Plan:     Problem List Items Addressed This Visit    None  Visit Diagnoses     Annual physical exam    -  Primary    Well woman exam with routine gynecological exam        Relevant Orders    Liquid-based pap, screening          Immunizations and preventive care screenings were discussed with patient today  Appropriate education was printed on patient's after visit summary  Counseling:  Dental Health: discussed importance of regular tooth brushing, flossing, and dental visits  Exercise: the importance of regular exercise/physical activity was discussed  Recommend exercise 3-5 times per week for at least 30 minutes  Pap done today  Return in 3 months with labs          Return in about 3 months (around 2023) for Review Labs  Chief Complaint:     Chief Complaint   Patient presents with   • Gynecologic Exam     Unsure of last exam  Denies any abn results in the past        History of Present Illness:     Adult Annual Physical   Patient here for a comprehensive physical exam  The patient reports problems - due for pap smear  [id number]  No vaginal discharge or bleeding  Post-menopausal    Mammogram ordered    Diet and Physical Activity  Diet/Nutrition: well balanced diet  Exercise: no formal exercise  Depression Screening  PHQ-2/9 Depression Screening         General Health  Hearing: no issues  Vision: goes for regular eye exams and wears glasses  Dental: regular dental visits  /GYN Health  Patient is: postmenopausal     Review of Systems:     Review of Systems   Constitutional: Negative for activity change  Respiratory: Negative for chest tightness and shortness of breath  Cardiovascular: Negative for chest pain and leg swelling     Genitourinary: Negative for difficulty urinating, dyspareunia, dysuria, frequency, genital sores, menstrual problem, pelvic pain, vaginal bleeding, vaginal discharge and vaginal pain  Neurological: Negative for headaches  Psychiatric/Behavioral: Negative for dysphoric mood  The patient is not nervous/anxious  Past Medical History:     No past medical history on file  Past Surgical History:     No past surgical history on file  Social History:     Social History     Socioeconomic History   • Marital status: /Civil Union     Spouse name: Not on file   • Number of children: Not on file   • Years of education: Not on file   • Highest education level: Not on file   Occupational History   • Not on file   Tobacco Use   • Smoking status: Former     Packs/day:  00     Years: 25 00     Pack years: 25 00     Types: Cigarettes     Quit date:      Years since quittin 0   • Smokeless tobacco: Never   Substance and Sexual Activity   • Alcohol use:  Yes   • Drug use: Not Currently   • Sexual activity: Not on file   Other Topics Concern   • Not on file   Social History Narrative   • Not on file     Social Determinants of Health     Financial Resource Strain: Not on file   Food Insecurity: Not on file   Transportation Needs: Not on file   Physical Activity: Not on file   Stress: Not on file   Social Connections: Not on file   Intimate Partner Violence: Not on file   Housing Stability: Not on file      Family History:     Family History   Problem Relation Age of Onset   • Hypertension Mother    • Heart disease Father    • COPD Father       Current Medications:     Current Outpatient Medications   Medication Sig Dispense Refill   • hydrochlorothiazide (HYDRODIURIL) 25 mg tablet Take 1 tablet (25 mg total) by mouth daily 30 tablet 2   • lisinopril (ZESTRIL) 10 mg tablet Take 1 tablet (10 mg total) by mouth daily 30 tablet 2   • naproxen (NAPROSYN) 500 mg tablet Take 1 tablet (500 mg total) by mouth 2 (two) times a day with meals (Patient taking differently: Take 500 mg by mouth 2 (two) times a day with meals As needed) 30 tablet 0     No current facility-administered medications for this visit  Allergies: Allergies   Allergen Reactions   • Penicillins Anaphylaxis   • Erythromycin Vomiting   • Salicylates Seizures      Physical Exam:     /84 (BP Location: Left arm, Patient Position: Sitting, Cuff Size: Large)   Pulse 72   Temp 97 8 °F (36 6 °C)   Ht 5' 6" (1 676 m)   Wt 89 8 kg (198 lb)   SpO2 97%   BMI 31 96 kg/m²     Physical Exam  Vitals and nursing note reviewed  Exam conducted with a chaperone present  Constitutional:       General: She is not in acute distress  Appearance: She is well-developed  She is not diaphoretic  HENT:      Head: Normocephalic and atraumatic  Right Ear: External ear normal       Left Ear: External ear normal    Eyes:      Conjunctiva/sclera: Conjunctivae normal    Cardiovascular:      Rate and Rhythm: Normal rate and regular rhythm  Heart sounds: Normal heart sounds  No murmur heard  No friction rub  No gallop  Pulmonary:      Effort: Pulmonary effort is normal  No respiratory distress  Breath sounds: Normal breath sounds  No stridor  No wheezing or rales  Chest:      Chest wall: No mass, lacerations, deformity, swelling or tenderness  Breasts:     Right: Normal  No swelling, bleeding, inverted nipple, mass, nipple discharge, skin change or tenderness  Left: Normal  No swelling, bleeding, inverted nipple, mass, nipple discharge, skin change or tenderness  Genitourinary:     Exam position: Prone  Pubic Area: No rash  Labia:         Right: No rash, tenderness, lesion or injury  Left: No rash, tenderness, lesion or injury  Vagina: Normal  No signs of injury and foreign body  No vaginal discharge, erythema, tenderness, bleeding, lesions or prolapsed vaginal walls        Cervix: No cervical motion tenderness, discharge, friability, lesion, erythema or cervical bleeding  Uterus: Normal        Adnexa: Right adnexa normal and left adnexa normal         Right: No tenderness or fullness  Left: No tenderness or fullness  Rectum: Normal    Lymphadenopathy:      Upper Body:      Right upper body: No axillary adenopathy  Left upper body: No axillary adenopathy  Neurological:      Mental Status: She is alert  Psychiatric:         Behavior: Behavior normal          Thought Content:  Thought content normal          Judgment: Judgment normal           Michael Hammer MD  [address] [place] [address] [id number]

## 2023-01-05 NOTE — PATIENT INSTRUCTIONS

## 2023-01-09 LAB
HPV HR 12 DNA CVX QL NAA+PROBE: NEGATIVE
HPV16 DNA CVX QL NAA+PROBE: NEGATIVE
HPV18 DNA CVX QL NAA+PROBE: NEGATIVE

## 2023-01-11 LAB
LAB AP GYN PRIMARY INTERPRETATION: NORMAL
Lab: NORMAL

## 2023-01-24 DIAGNOSIS — I10 PRIMARY HYPERTENSION: ICD-10-CM

## 2023-01-24 RX ORDER — HYDROCHLOROTHIAZIDE 25 MG/1
25 TABLET ORAL DAILY
Qty: 30 TABLET | Refills: 2 | Status: SHIPPED | OUTPATIENT
Start: 2023-01-24

## 2023-02-03 DIAGNOSIS — I10 PRIMARY HYPERTENSION: ICD-10-CM

## 2023-02-03 RX ORDER — LISINOPRIL 10 MG/1
10 TABLET ORAL DAILY
Qty: 30 TABLET | Refills: 2 | Status: SHIPPED | OUTPATIENT
Start: 2023-02-03

## 2023-04-28 DIAGNOSIS — I10 PRIMARY HYPERTENSION: ICD-10-CM

## 2023-04-28 RX ORDER — LISINOPRIL 10 MG/1
10 TABLET ORAL DAILY
Qty: 30 TABLET | Refills: 0 | Status: SHIPPED | OUTPATIENT
Start: 2023-04-28

## 2023-04-28 RX ORDER — HYDROCHLOROTHIAZIDE 25 MG/1
25 TABLET ORAL DAILY
Qty: 30 TABLET | Refills: 0 | Status: SHIPPED | OUTPATIENT
Start: 2023-04-28

## 2023-05-25 DIAGNOSIS — I10 PRIMARY HYPERTENSION: ICD-10-CM

## 2023-05-25 RX ORDER — HYDROCHLOROTHIAZIDE 25 MG/1
25 TABLET ORAL DAILY
Qty: 30 TABLET | Refills: 0 | Status: SHIPPED | OUTPATIENT
Start: 2023-05-25

## 2023-06-04 DIAGNOSIS — I10 PRIMARY HYPERTENSION: ICD-10-CM

## 2023-06-05 RX ORDER — LISINOPRIL 10 MG/1
10 TABLET ORAL DAILY
Qty: 30 TABLET | Refills: 0 | Status: SHIPPED | OUTPATIENT
Start: 2023-06-05

## 2023-06-29 DIAGNOSIS — I10 PRIMARY HYPERTENSION: ICD-10-CM

## 2023-06-29 RX ORDER — HYDROCHLOROTHIAZIDE 25 MG/1
25 TABLET ORAL DAILY
Qty: 30 TABLET | Refills: 0 | Status: SHIPPED | OUTPATIENT
Start: 2023-06-29

## 2023-07-04 DIAGNOSIS — I10 PRIMARY HYPERTENSION: ICD-10-CM

## 2023-07-05 RX ORDER — LISINOPRIL 10 MG/1
10 TABLET ORAL DAILY
Qty: 30 TABLET | Refills: 0 | Status: SHIPPED | OUTPATIENT
Start: 2023-07-05

## 2023-07-17 ENCOUNTER — APPOINTMENT (OUTPATIENT)
Dept: LAB | Facility: CLINIC | Age: 53
End: 2023-07-17
Payer: COMMERCIAL

## 2023-07-17 DIAGNOSIS — E78.2 MIXED HYPERLIPIDEMIA: ICD-10-CM

## 2023-07-17 DIAGNOSIS — R73.03 PREDIABETES: ICD-10-CM

## 2023-07-17 LAB
ALBUMIN SERPL BCP-MCNC: 4 G/DL (ref 3.5–5)
ALP SERPL-CCNC: 89 U/L (ref 46–116)
ALT SERPL W P-5'-P-CCNC: 24 U/L (ref 12–78)
ANION GAP SERPL CALCULATED.3IONS-SCNC: 2 MMOL/L
AST SERPL W P-5'-P-CCNC: 13 U/L (ref 5–45)
BILIRUB SERPL-MCNC: 0.29 MG/DL (ref 0.2–1)
BUN SERPL-MCNC: 16 MG/DL (ref 5–25)
CALCIUM SERPL-MCNC: 9.2 MG/DL (ref 8.3–10.1)
CHLORIDE SERPL-SCNC: 106 MMOL/L (ref 96–108)
CHOLEST SERPL-MCNC: 238 MG/DL
CO2 SERPL-SCNC: 29 MMOL/L (ref 21–32)
CREAT SERPL-MCNC: 0.86 MG/DL (ref 0.6–1.3)
GFR SERPL CREATININE-BSD FRML MDRD: 76 ML/MIN/1.73SQ M
GLUCOSE P FAST SERPL-MCNC: 89 MG/DL (ref 65–99)
HDLC SERPL-MCNC: 51 MG/DL
LDLC SERPL CALC-MCNC: 133 MG/DL (ref 0–100)
POTASSIUM SERPL-SCNC: 4.1 MMOL/L (ref 3.5–5.3)
PROT SERPL-MCNC: 7.3 G/DL (ref 6.4–8.4)
SODIUM SERPL-SCNC: 137 MMOL/L (ref 135–147)
TRIGL SERPL-MCNC: 272 MG/DL

## 2023-07-17 PROCEDURE — 36415 COLL VENOUS BLD VENIPUNCTURE: CPT

## 2023-07-17 PROCEDURE — 80053 COMPREHEN METABOLIC PANEL: CPT

## 2023-07-17 PROCEDURE — 83036 HEMOGLOBIN GLYCOSYLATED A1C: CPT

## 2023-07-17 PROCEDURE — 80061 LIPID PANEL: CPT

## 2023-07-18 LAB
EST. AVERAGE GLUCOSE BLD GHB EST-MCNC: 123 MG/DL
HBA1C MFR BLD: 5.9 %

## 2023-07-20 ENCOUNTER — OFFICE VISIT (OUTPATIENT)
Dept: URGENT CARE | Facility: CLINIC | Age: 53
End: 2023-07-20
Payer: COMMERCIAL

## 2023-07-20 VITALS
RESPIRATION RATE: 18 BRPM | DIASTOLIC BLOOD PRESSURE: 77 MMHG | HEART RATE: 63 BPM | TEMPERATURE: 97.3 F | OXYGEN SATURATION: 97 % | SYSTOLIC BLOOD PRESSURE: 108 MMHG

## 2023-07-20 DIAGNOSIS — H93.11 TINNITUS, RIGHT EAR: Primary | ICD-10-CM

## 2023-07-20 PROCEDURE — 99213 OFFICE O/P EST LOW 20 MIN: CPT | Performed by: PHYSICIAN ASSISTANT

## 2023-07-20 NOTE — PATIENT INSTRUCTIONS
- Please take a decongestant such as Coricidin HBP and/or an antihistamine   - If symptoms persist, please see an ENT specialist.

## 2023-07-20 NOTE — PROGRESS NOTES
[facility]        NAME: Sander Griggs is a 45 y.o. female  : 1970    MRN: [id number]  DATE: 2023  TIME: 6:47 PM      Assessment and Plan     Tinnitus, right ear [H93.11]  1. Tinnitus, right ear          Note:   I suspect possible sinus congestion causing the tinnitus. Patient Instructions     Patient Instructions   - Please take a decongestant such as Coricidin HBP and/or an antihistamine   - If symptoms persist, please see an ENT specialist.                      Follow up with PCP in 3-5 days. Go to ER if symptoms worsen. Chief Complaint     Chief Complaint   Patient presents with   • Cerumen Impaction     FEELS RIGHT EAR IS CLOGGED, SINCE YESTERDAY         History of Present Illness     Patient presents with right ear clogged feeling and ringing x today. Review of Systems     Review of Systems   Constitutional: Negative for chills, fatigue and fever. HENT: Negative for congestion, ear discharge, ear pain, postnasal drip, rhinorrhea, sinus pressure, sinus pain and sore throat. Eyes: Negative for pain and visual disturbance. Respiratory: Negative for cough, chest tightness and shortness of breath. Cardiovascular: Negative for chest pain and palpitations. Gastrointestinal: Negative for abdominal pain, diarrhea, nausea and vomiting. Genitourinary: Negative for dysuria and hematuria. Musculoskeletal: Negative for arthralgias, back pain and myalgias. Skin: Negative for rash. Neurological: Negative for dizziness, seizures, syncope, numbness and headaches. All other systems reviewed and are negative.         Current Medications       Current Outpatient Medications:   •  hydrochlorothiazide (HYDRODIURIL) 25 mg tablet, Take 1 tablet (25 mg total) by mouth daily, Disp: 30 tablet, Rfl: 0  •  lisinopril (ZESTRIL) 10 mg tablet, TAKE 1 TABLET (10 MG TOTAL) BY MOUTH DAILY, Disp: 30 tablet, Rfl: 0  •  naproxen (NAPROSYN) 500 mg tablet, Take 1 tablet (500 mg total) by mouth 2 (two) times a day with meals (Patient not taking: Reported on 7/20/2023), Disp: 30 tablet, Rfl: 0    Current Allergies     Allergies as of 07/20/2023 - Reviewed 07/20/2023   Allergen Reaction Noted   • Penicillins Anaphylaxis 09/05/2021   • Erythromycin Vomiting 03/83/8377   • Salicylates Seizures 72/21/7023              The following portions of the patient's history were reviewed and updated as appropriate: allergies, current medications, past family history, past medical history, past social history, past surgical history, and problem list.     History reviewed. No pertinent past medical history. History reviewed. No pertinent surgical history. Family History   Problem Relation Age of Onset   • Hypertension Mother    • Heart disease Father    • COPD Father          Medications have been verified. Objective     /77   Pulse 63   Temp (!) 97.3 °F (36.3 °C)   Resp 18   SpO2 97%   No LMP recorded. Patient is postmenopausal.         Physical Exam     Physical Exam  Vitals and nursing note reviewed. Constitutional:       Appearance: Normal appearance. She is normal weight. HENT:      Head: Normocephalic and atraumatic. Right Ear: Tympanic membrane, ear canal and external ear normal.      Left Ear: Tympanic membrane, ear canal and external ear normal.   Cardiovascular:      Rate and Rhythm: Normal rate and regular rhythm. Heart sounds: Normal heart sounds. Pulmonary:      Effort: Pulmonary effort is normal.      Breath sounds: Normal breath sounds. Skin:     General: Skin is warm and dry. Neurological:      General: No focal deficit present. Mental Status: She is alert and oriented to person, place, and time.    Psychiatric:         Mood and Affect: Mood normal.         Behavior: Behavior normal.

## 2023-07-25 DIAGNOSIS — I10 PRIMARY HYPERTENSION: ICD-10-CM

## 2023-07-25 RX ORDER — HYDROCHLOROTHIAZIDE 25 MG/1
25 TABLET ORAL DAILY
Qty: 30 TABLET | Refills: 0 | Status: SHIPPED | OUTPATIENT
Start: 2023-07-25 | End: 2023-07-28 | Stop reason: SDUPTHER

## 2023-07-28 ENCOUNTER — OFFICE VISIT (OUTPATIENT)
Dept: FAMILY MEDICINE CLINIC | Facility: CLINIC | Age: 53
End: 2023-07-28
Payer: COMMERCIAL

## 2023-07-28 VITALS
DIASTOLIC BLOOD PRESSURE: 81 MMHG | WEIGHT: 188 LBS | HEART RATE: 74 BPM | BODY MASS INDEX: 30.22 KG/M2 | HEIGHT: 66 IN | OXYGEN SATURATION: 100 % | TEMPERATURE: 98.7 F | SYSTOLIC BLOOD PRESSURE: 119 MMHG

## 2023-07-28 DIAGNOSIS — E78.2 MIXED HYPERLIPIDEMIA: Primary | ICD-10-CM

## 2023-07-28 DIAGNOSIS — H61.21 IMPACTED CERUMEN OF RIGHT EAR: ICD-10-CM

## 2023-07-28 DIAGNOSIS — I10 PRIMARY HYPERTENSION: ICD-10-CM

## 2023-07-28 DIAGNOSIS — H93.11 TINNITUS OF RIGHT EAR: ICD-10-CM

## 2023-07-28 DIAGNOSIS — R73.03 PREDIABETES: ICD-10-CM

## 2023-07-28 PROCEDURE — 69210 REMOVE IMPACTED EAR WAX UNI: CPT | Performed by: FAMILY MEDICINE

## 2023-07-28 PROCEDURE — 99214 OFFICE O/P EST MOD 30 MIN: CPT | Performed by: FAMILY MEDICINE

## 2023-07-28 RX ORDER — HYDROCHLOROTHIAZIDE 25 MG/1
25 TABLET ORAL DAILY
Qty: 90 TABLET | Refills: 3 | Status: SHIPPED | OUTPATIENT
Start: 2023-07-28

## 2023-07-28 RX ORDER — LISINOPRIL 10 MG/1
10 TABLET ORAL DAILY
Qty: 90 TABLET | Refills: 3 | Status: SHIPPED | OUTPATIENT
Start: 2023-07-28

## 2023-07-28 NOTE — PROGRESS NOTES
Assessment/Plan:         Problem List Items Addressed This Visit        Cardiovascular and Mediastinum    Primary hypertension     blood pressure controlled on HCTZ and lisinopril         Relevant Medications    hydrochlorothiazide (HYDRODIURIL) 25 mg tablet    lisinopril (ZESTRIL) 10 mg tablet       Nervous and Auditory    Impacted cerumen of right ear     Cerumen cleared         Relevant Orders    Ear cerumen removal (Completed)       Other    Mixed hyperlipidemia - Primary     Continue weight loss, low fat diet         Relevant Orders    Lipid Panel with Direct LDL reflex    Prediabetes     Continue weight loss, low carb diet         Relevant Orders    Hemoglobin A6T    Basic metabolic panel    Tinnitus of right ear     R ear cerumen removed today. TM appears normal. Advised consultation with ENT if symptoms fail to improve. Relevant Orders    Ambulatory Referral to Otolaryngology         Subjective:      Patient ID: Thanh Graf is a 45 y.o. female. She had been seen in urgent care for tinnitus and clogging feeling in the R ear for past week. She feels like her ear is clogged "like I'm under water". She tried an antihistamine and Coricidin HBP. Not taking aspirin or NSAIDs. She does have cerumen impaction on exam.    She had labs done - A1c 5.9%, lipids have improved. She has lost 10 lbs with dietary changes.        The following portions of the patient's history were reviewed and updated as appropriate:   Past Medical History:  She has no past medical history on file.,  _______________________________________________________________________  Medical Problems:  does not have any pertinent problems on file.,  _______________________________________________________________________  Past Surgical History:   has no past surgical history on file.,  _______________________________________________________________________  Family History:  family history includes COPD in her father; Heart disease in her father; Hypertension in her mother.,  _______________________________________________________________________  Social History:   reports that she quit smoking about 14 years ago. Her smoking use included cigarettes. She has a 25.00 pack-year smoking history. She has never used smokeless tobacco. She reports current alcohol use. She reports that she does not currently use drugs. ,  _______________________________________________________________________  Allergies:  is allergic to penicillins, erythromycin, and salicylates. .  _______________________________________________________________________  Current Outpatient Medications   Medication Sig Dispense Refill   • hydrochlorothiazide (HYDRODIURIL) 25 mg tablet Take 1 tablet (25 mg total) by mouth daily 90 tablet 3   • lisinopril (ZESTRIL) 10 mg tablet Take 1 tablet (10 mg total) by mouth daily 90 tablet 3     No current facility-administered medications for this visit.     _______________________________________________________________________  Review of Systems   Constitutional: Negative for chills and fever. HENT: Positive for ear pain and tinnitus. Negative for congestion. Objective:  Vitals:    07/28/23 0957   BP: 119/81   Pulse: 74   Temp: 98.7 °F (37.1 °C)   SpO2: 100%   Weight: 85.3 kg (188 lb)   Height: 5' 6" (1.676 m)     Body mass index is 30.34 kg/m². Physical Exam  Vitals and nursing note reviewed. Constitutional:       General: She is not in acute distress. Appearance: Normal appearance. She is not ill-appearing, toxic-appearing or diaphoretic. HENT:      Head: Normocephalic and atraumatic. Right Ear: Tympanic membrane and external ear normal. There is impacted cerumen. Left Ear: Tympanic membrane and external ear normal.      Nose: Nose normal.      Mouth/Throat:      Mouth: Mucous membranes are moist.      Pharynx: Oropharynx is clear. Cardiovascular:      Rate and Rhythm: Normal rate and regular rhythm.       Heart sounds: No murmur heard. No friction rub. Pulmonary:      Effort: Pulmonary effort is normal. No respiratory distress. Breath sounds: Normal breath sounds. No stridor. No wheezing, rhonchi or rales. Musculoskeletal:         General: No swelling. Right lower leg: No edema. Left lower leg: No edema. Neurological:      General: No focal deficit present. Mental Status: She is alert. Mental status is at baseline. Psychiatric:         Attention and Perception: Attention normal.         Mood and Affect: Mood normal.         Speech: Speech normal.         Behavior: Behavior normal.         Thought Content: Thought content normal.         Judgment: Judgment normal.         Ear cerumen removal    Date/Time: 7/28/2023 10:00 AM    Performed by: Tika Caicedo MD  Authorized by: Tika Caicedo MD  Universal Protocol:  Consent: Written consent obtained. Risks and benefits: risks, benefits and alternatives were discussed  Consent given by: patient  Timeout called at: 7/28/2023 10:24 AM.    Patient location:  Clinic  Procedure details:     Location:  R ear    Procedure type: irrigation with instrumentation      Instrumentation: curette      Approach:  External  Post-procedure details:     Complication:  None    Hearing quality:  Improved    Patient tolerance of procedure:   Tolerated well, no immediate complications

## 2023-09-26 PROBLEM — H61.21 IMPACTED CERUMEN OF RIGHT EAR: Status: RESOLVED | Noted: 2023-07-28 | Resolved: 2023-09-26

## 2023-11-24 DIAGNOSIS — I10 PRIMARY HYPERTENSION: ICD-10-CM

## 2023-11-24 RX ORDER — HYDROCHLOROTHIAZIDE 25 MG/1
25 TABLET ORAL DAILY
Qty: 30 TABLET | Refills: 3 | Status: SHIPPED | OUTPATIENT
Start: 2023-11-24

## 2024-03-23 ENCOUNTER — OFFICE VISIT (OUTPATIENT)
Dept: URGENT CARE | Facility: CLINIC | Age: 54
End: 2024-03-23
Payer: COMMERCIAL

## 2024-03-23 VITALS
DIASTOLIC BLOOD PRESSURE: 90 MMHG | OXYGEN SATURATION: 98 % | SYSTOLIC BLOOD PRESSURE: 112 MMHG | TEMPERATURE: 97.1 F | HEART RATE: 74 BPM | RESPIRATION RATE: 18 BRPM
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
## 2024-03-23 DIAGNOSIS — S90.562A INSECT BITE OF LEFT ANKLE, INITIAL ENCOUNTER: Primary | ICD-10-CM

## 2024-03-23 PROCEDURE — 99213 OFFICE O/P EST LOW 20 MIN: CPT | Performed by: PHYSICIAN ASSISTANT

## 2024-03-23 RX ORDER — DOXYCYCLINE HYCLATE 100 MG/1
100 CAPSULE ORAL EVERY 12 HOURS SCHEDULED
Qty: 20 CAPSULE | Refills: 0 | Status: SHIPPED | OUTPATIENT
Start: 2024-03-23 | End: 2024-04-02

## 2024-03-23 NOTE — PATIENT INSTRUCTIONS
Insect Bite or Sting   WHAT YOU NEED TO KNOW:   Most insect bites and stings are not dangerous and go away without treatment. Your symptoms may be mild, or you may develop anaphylaxis. Anaphylaxis is a sudden, life-threatening reaction that needs immediate treatment. Common examples of insects that bite or sting are bees, ticks, mosquitoes, spiders, and ants. Insect bites or stings can lead to diseases such as malaria, West Nile virus, Lyme disease, or [place] Spotted Fever.  DISCHARGE INSTRUCTIONS:   Call your local emergency number (911 in the ) for signs or symptoms of anaphylaxis,  such as trouble breathing, swelling in your mouth or throat, or wheezing. You may also have itching, a rash, hives, or feel like you are going to faint.  Return to the emergency department if:   You are stung on your tongue or in your throat.    A white area forms around the bite.    You are sweating badly or have body pain.    You think you were bitten or stung by a poisonous insect.    Call your doctor if:   You have a fever.    The area becomes red, warm, tender, and swollen beyond the area of the bite or sting.    You have questions or concerns about your condition or care.    Medicines:  You may need any of the following:  Antihistamines  decrease itching and rash.    Epinephrine  is used to treat severe allergic reactions such as anaphylaxis.    Take your medicine as directed.  Contact your healthcare provider if you think your medicine is not helping or if you have side effects. Tell your provider if you are allergic to any medicine. Keep a list of the medicines, vitamins, and herbs you take. Include the amounts, and when and why you take them. Bring the list or the pill bottles to follow-up visits. Carry your medicine list with you in case of an emergency.    Steps to take for signs or symptoms of anaphylaxis:   Immediately  give 1 shot of epinephrine only into the outer thigh muscle.         Leave the shot in place   as directed. Your healthcare provider may recommend you leave it in place for up to 10 seconds before you remove it. This helps make sure all of the epinephrine is delivered.    Call 911 and go to the emergency department,  even if the shot improved symptoms. Do not drive yourself. Bring the used epinephrine shot with you.    Safety precautions to take if you are at risk for anaphylaxis:   Keep 2 shots of epinephrine with you at all times.  You may need a second shot, because epinephrine only works for about 20 minutes and symptoms may return. Your healthcare provider can show you and family members how to give the shot. Check the expiration date every month and replace it before it expires.    Create an action plan.  Your healthcare provider can help you create a written plan that explains the allergy and an emergency plan to treat a reaction. The plan explains when to give a second epinephrine shot if symptoms return or do not improve after the first. Give copies of the action plan and emergency instructions to family members, work and school staff, and  providers. Show them how to give a shot of epinephrine.    Carry medical alert identification.  Wear medical alert jewelry or carry a card that says you have an insect allergy. Ask your healthcare provider where to get these items.       If an insect bites or stings you:   Remove the stinger.  Scrape the stinger out with your fingernail, edge of a credit card, or a knife blade. Do not squeeze the wound. Gently wash the area with soap and water.    Remove the tick.  Ticks must be removed as soon as possible so you do not get diseases passed through tick bites. Ask your healthcare provider for more information on tick bites and how to remove ticks.    Care for a bite or sting wound:   Elevate (raise) the area above the level of your heart, if possible.  Prop the area on pillows to keep it raised comfortably. Elevate the area for 10 to 20 minutes each hour or  as directed by your healthcare provider.         Use compresses.  Soak a clean washcloth in cold water, wring it out, and put it on the bite or sting. Use the compress for 10 to 20 minutes each hour or as directed by your healthcare provider. After 24 to 48 hours, change to warm compresses.    Apply a paste.  Add water to baking soda to make a thick paste. Put the paste on the area for 5 minutes. Rinse gently to remove the paste.    Prevent another insect bite or sting:   Do not wear bright-colored or flower-print clothing when you plan to spend time outdoors. Do not use hairspray, perfumes, or aftershave.    Do not leave food out.    Empty any standing water and wash container with soap and water every 2 days.    Put screens on all open windows and doors.    Put insect repellent that contains DEET on skin that is showing when you go outside. Put insect repellent at the top of your boots, bottom of pant legs, and sleeve cuffs. Wear long sleeves, pants, and shoes.    Use citronella candles outdoors to help keep mosquitoes away. Put a tick and flea collar on pets.    Follow up with your doctor as directed:  Write down your questions so you remember to ask them during your visits.  © Copyright [employer] 2023 Information is for End User's use only and may not be sold, redistributed or otherwise used for commercial purposes.  The above information is an  only. It is not intended as medical advice for individual conditions or treatments. Talk to your doctor, nurse or pharmacist before following any medical regimen to see if it is safe and effective for you.

## 2024-03-23 NOTE — PROGRESS NOTES
[facility]        NAME: Patricia Argueta is a 53 y.o. female  : 1970    MRN: [id number]  DATE: 2024  TIME: 12:08 PM    Assessment and Plan   Insect bite of left ankle, initial encounter [S90.562A, W57.XXXA]  1. Insect bite of left ankle, initial encounter  doxycycline hyclate (VIBRAMYCIN) 100 mg capsule            Patient Instructions     Doxy as directed   Probiotics while on Anti-biotic  Warm compresses    Follow up with PCP in 3-5 days.  Proceed to  ER if symptoms worsen.    If tests are performed, our office will contact you with results only if changes need to made to the care plan discussed with you at the visit. [facility]    Chief Complaint     Chief Complaint   Patient presents with    Insect Bite     Patient here with what she believes to be a spider bite on her left ankle. She did not see the spider but she felt when the bite happened. She said this happened 3/15 and the bite is now worsening per patient         History of Present Illness       Insect Bite  This is a new problem. The current episode started in the past 7 days. The problem occurs rarely. The problem has been gradually worsening. Associated symptoms include a rash. Pertinent negatives include no abdominal pain, chills, coughing, fever, nausea, sore throat or vomiting. The symptoms are aggravated by walking. The treatment provided no relief.       Review of Systems   Review of Systems   Constitutional:  Negative for chills and fever.   HENT:  Negative for sore throat.    Respiratory:  Negative for cough.    Gastrointestinal:  Negative for abdominal pain, nausea and vomiting.   Skin:  Positive for rash.         Current Medications       Current Outpatient Medications:     doxycycline hyclate (VIBRAMYCIN) 100 mg capsule, Take 1 capsule (100 mg total) by mouth every 12 (twelve) hours for 10 days, Disp: 20 capsule, Rfl: 0    hydrochlorothiazide (HYDRODIURIL) 25 mg tablet,  TAKE 1 TABLET (25 MG TOTAL) BY MOUTH DAILY, Disp: 30 tablet, Rfl: 3    lisinopril (ZESTRIL) 10 mg tablet, Take 1 tablet (10 mg total) by mouth daily, Disp: 90 tablet, Rfl: 3    Current Allergies     Allergies as of 03/23/2024 - Reviewed 03/23/2024   Allergen Reaction Noted    Penicillins Anaphylaxis 09/05/2021    Erythromycin Vomiting 01/29/2003    Salicylates Seizures 01/29/2003            The following portions of the patient's history were reviewed and updated as appropriate: allergies, current medications, past family history, past medical history, past social history, past surgical history and problem list.     Past Medical History:   Diagnosis Date    Hypertension        History reviewed. No pertinent surgical history.    Family History   Problem Relation Age of Onset    Hypertension Mother     Heart disease Father     COPD Father          Medications have been verified.        Objective   /90   Pulse 74   Temp (!) 97.1 °F (36.2 °C)   Resp 18   SpO2 98%        Physical Exam     Physical Exam  Vitals and nursing note reviewed.   Constitutional:       General: She is not in acute distress.     Appearance: Normal appearance. She is not ill-appearing.   Eyes:      General: No scleral icterus.     Extraocular Movements: Extraocular movements intact.      Conjunctiva/sclera: Conjunctivae normal.      Pupils: Pupils are equal, round, and reactive to light.   Cardiovascular:      Rate and Rhythm: Normal rate and regular rhythm.      Pulses: Normal pulses.   Pulmonary:      Effort: Pulmonary effort is normal. No respiratory distress.   Musculoskeletal:         General: Normal range of motion.      Cervical back: Normal range of motion and neck supple.   Skin:     Findings: Rash (dime size macular tender lesionover the dorsal aspect of the left ankle.) present.   Neurological:      Mental Status: She is alert and oriented to person, place, and time.      Coordination: Coordination normal.      Gait: Gait  normal.   Psychiatric:         Mood and Affect: Mood normal.         Behavior: Behavior normal.

## 2024-07-11 DIAGNOSIS — I10 PRIMARY HYPERTENSION: ICD-10-CM

## 2024-07-11 RX ORDER — LISINOPRIL 10 MG/1
10 TABLET ORAL DAILY
Qty: 30 TABLET | Refills: 0 | Status: SHIPPED | OUTPATIENT
Start: 2024-07-11

## 2024-07-11 NOTE — TELEPHONE ENCOUNTER
Patient needs an appointment. Please contact the patient to schedule an appointment. Courtesy refill provided.

## 2024-08-03 DIAGNOSIS — I10 PRIMARY HYPERTENSION: ICD-10-CM

## 2024-08-05 DIAGNOSIS — I10 PRIMARY HYPERTENSION: ICD-10-CM

## 2024-08-05 RX ORDER — HYDROCHLOROTHIAZIDE 25 MG/1
25 TABLET ORAL DAILY
Qty: 30 TABLET | Refills: 3 | Status: SHIPPED | OUTPATIENT
Start: 2024-08-05

## 2024-08-05 RX ORDER — LISINOPRIL 10 MG/1
10 TABLET ORAL DAILY
Qty: 30 TABLET | Refills: 0 | Status: SHIPPED | OUTPATIENT
Start: 2024-08-05

## 2024-08-29 DIAGNOSIS — I10 PRIMARY HYPERTENSION: ICD-10-CM

## 2024-08-29 RX ORDER — LISINOPRIL 10 MG/1
10 TABLET ORAL DAILY
Qty: 30 TABLET | Refills: 0 | Status: SHIPPED | OUTPATIENT
Start: 2024-08-29

## 2024-09-06 DIAGNOSIS — I10 PRIMARY HYPERTENSION: ICD-10-CM

## 2024-09-06 RX ORDER — LISINOPRIL 10 MG/1
10 TABLET ORAL DAILY
Qty: 30 TABLET | Refills: 0 | Status: SHIPPED | OUTPATIENT
Start: 2024-09-06

## 2024-09-06 NOTE — TELEPHONE ENCOUNTER
Reason for call:   [x] Refill   [] Prior Auth  [x] Other: pt called states her pharmacy never received her lisinopril if we can resend it, so rx was sent.    Lisinopril 10mg # 30    [facility] - [place] - [id number]

## 2024-09-16 ENCOUNTER — OFFICE VISIT (OUTPATIENT)
Dept: FAMILY MEDICINE CLINIC | Facility: CLINIC | Age: 54
End: 2024-09-16
Payer: COMMERCIAL

## 2024-09-16 VITALS
WEIGHT: 197 LBS | TEMPERATURE: 98.2 F | OXYGEN SATURATION: 97 % | DIASTOLIC BLOOD PRESSURE: 74 MMHG | HEIGHT: 66 IN | HEART RATE: 70 BPM | BODY MASS INDEX: 31.66 KG/M2 | SYSTOLIC BLOOD PRESSURE: 122 MMHG

## 2024-09-16 DIAGNOSIS — Z12.31 VISIT FOR SCREENING MAMMOGRAM: ICD-10-CM

## 2024-09-16 DIAGNOSIS — Z00.00 ANNUAL PHYSICAL EXAM: Primary | ICD-10-CM

## 2024-09-16 DIAGNOSIS — Z12.11 COLON CANCER SCREENING: ICD-10-CM

## 2024-09-16 DIAGNOSIS — Z23 INFLUENZA VACCINE NEEDED: ICD-10-CM

## 2024-09-16 PROCEDURE — 90471 IMMUNIZATION ADMIN: CPT

## 2024-09-16 PROCEDURE — 99396 PREV VISIT EST AGE 40-64: CPT | Performed by: FAMILY MEDICINE

## 2024-09-16 PROCEDURE — 90673 RIV3 VACCINE NO PRESERV IM: CPT

## 2024-10-28 DIAGNOSIS — I10 PRIMARY HYPERTENSION: ICD-10-CM

## 2024-10-29 RX ORDER — LISINOPRIL 10 MG/1
10 TABLET ORAL DAILY
Qty: 30 TABLET | Refills: 0 | Status: SHIPPED | OUTPATIENT
Start: 2024-10-29

## 2024-11-21 DIAGNOSIS — I10 PRIMARY HYPERTENSION: ICD-10-CM

## 2024-11-22 RX ORDER — LISINOPRIL 10 MG/1
10 TABLET ORAL DAILY
Qty: 90 TABLET | Refills: 3 | Status: SHIPPED | OUTPATIENT
Start: 2024-11-22

## 2024-12-18 DIAGNOSIS — I10 PRIMARY HYPERTENSION: ICD-10-CM

## 2024-12-18 RX ORDER — HYDROCHLOROTHIAZIDE 25 MG/1
25 TABLET ORAL DAILY
Qty: 30 TABLET | Refills: 3 | Status: SHIPPED | OUTPATIENT
Start: 2024-12-18

## 2025-01-22 ENCOUNTER — OFFICE VISIT (OUTPATIENT)
Dept: URGENT CARE | Facility: CLINIC | Age: 55
End: 2025-01-22
Payer: COMMERCIAL

## 2025-01-22 VITALS
RESPIRATION RATE: 19 BRPM | TEMPERATURE: 98.2 F | SYSTOLIC BLOOD PRESSURE: 124 MMHG | OXYGEN SATURATION: 97 % | DIASTOLIC BLOOD PRESSURE: 78 MMHG | HEART RATE: 101 BPM

## 2025-01-22 DIAGNOSIS — S39.012A STRAIN OF MUSCLE, FASCIA AND TENDON OF LOWER BACK, INITIAL ENCOUNTER: Primary | ICD-10-CM

## 2025-01-22 DIAGNOSIS — S76.012A STRAIN OF LEFT HIP, INITIAL ENCOUNTER: ICD-10-CM

## 2025-01-22 PROCEDURE — S9083 URGENT CARE CENTER GLOBAL: HCPCS | Performed by: NURSE PRACTITIONER

## 2025-01-22 PROCEDURE — 96372 THER/PROPH/DIAG INJ SC/IM: CPT | Performed by: NURSE PRACTITIONER

## 2025-01-22 PROCEDURE — G0383 LEV 4 HOSP TYPE B ED VISIT: HCPCS | Performed by: NURSE PRACTITIONER

## 2025-01-22 RX ORDER — NAPROXEN 500 MG/1
500 TABLET ORAL 2 TIMES DAILY WITH MEALS
Qty: 30 TABLET | Refills: 0 | Status: SHIPPED | OUTPATIENT
Start: 2025-01-22

## 2025-01-22 RX ORDER — LIDOCAINE 50 MG/G
1 PATCH TOPICAL DAILY
Qty: 10 PATCH | Refills: 0 | Status: SHIPPED | OUTPATIENT
Start: 2025-01-22 | End: 2025-02-01

## 2025-01-22 RX ORDER — CYCLOBENZAPRINE HCL 10 MG
10 TABLET ORAL 3 TIMES DAILY PRN
Qty: 15 TABLET | Refills: 0 | Status: SHIPPED | OUTPATIENT
Start: 2025-01-22

## 2025-01-22 RX ORDER — PREDNISONE 20 MG/1
TABLET ORAL
Qty: 18 TABLET | Refills: 0 | Status: SHIPPED | OUTPATIENT
Start: 2025-01-22 | End: 2025-01-31

## 2025-01-22 RX ORDER — KETOROLAC TROMETHAMINE 30 MG/ML
30 INJECTION, SOLUTION INTRAMUSCULAR; INTRAVENOUS ONCE
Status: COMPLETED | OUTPATIENT
Start: 2025-01-22 | End: 2025-01-22

## 2025-01-22 RX ADMIN — KETOROLAC TROMETHAMINE 30 MG: 30 INJECTION, SOLUTION INTRAMUSCULAR; INTRAVENOUS at 15:41

## 2025-01-22 NOTE — PROGRESS NOTES
[facility]        NAME: Patricia Argueta is a 54 y.o. female  : 1970    MRN: [id number]  DATE: 2025  TIME: 3:44 PM    Assessment and Plan   Strain of muscle, fascia and tendon of lower back, initial encounter [S39.012A]  1. Strain of muscle, fascia and tendon of lower back, initial encounter  ketorolac (TORADOL) injection 30 mg    predniSONE 20 mg tablet    naproxen (EC NAPROSYN) 500 MG EC tablet    lidocaine (Lidoderm) 5 %    cyclobenzaprine (FLEXERIL) 10 mg tablet      2. Strain of left hip, initial encounter  ketorolac (TORADOL) injection 30 mg    predniSONE 20 mg tablet    naproxen (EC NAPROSYN) 500 MG EC tablet    lidocaine (Lidoderm) 5 %    cyclobenzaprine (FLEXERIL) 10 mg tablet        Toradol given in office today. Advised she can start prednisone taper, take early in the day with food. Topical lidocaine patch on for 12 hr then off for 12. Heat or ice as tolerated. Flexeril up to 3 times daily, can cause drowsiness do not take if driving. After steroid taper can use naproxen bid. If no improvements in pain advised to follow up with PT.     Patient Instructions       Follow up with PCP in 3-5 days.  Proceed to  ER if symptoms worsen.    If tests are performed, our office will contact you with results only if changes need to made to the care plan discussed with you at the visit. You can review your full results on [facility].    Chief Complaint     Chief Complaint   Patient presents with    Pain     Pt sattes she was leaning over the table to pick something up off floor and pulled her left lower back, hip, into thigh and knee and now unable to walk. Pain 10+, sharp constant.          History of Present Illness       Felt a pulled muscle in her low back and left hip when reaching over to  a paper plate from the table. Denies any prior hip injuries. She does admit to back injury in the past. States she took 3 Advil and one of her daughters muscle relaxer's without  relief of pain. She did use a heating pad. Denies any falls or trauma. States she does watch her grandchildren who are young and often has to pick them up and hold them.         Review of Systems   Review of Systems   Constitutional:  Negative for chills, fatigue and fever.   Musculoskeletal:  Positive for arthralgias and myalgias.   Skin:  Negative for color change, pallor, rash and wound.   Neurological:  Negative for weakness and numbness.   Hematological:  Negative for adenopathy. Does not bruise/bleed easily.   Psychiatric/Behavioral:  Positive for sleep disturbance.          Current Medications       Current Outpatient Medications:     cyclobenzaprine (FLEXERIL) 10 mg tablet, Take 1 tablet (10 mg total) by mouth 3 (three) times a day as needed for muscle spasms, Disp: 15 tablet, Rfl: 0    hydroCHLOROthiazide 25 mg tablet, TAKE 1 TABLET (25 MG TOTAL) BY MOUTH DAILY, Disp: 30 tablet, Rfl: 3    lidocaine (Lidoderm) 5 %, Apply 1 patch topically over 12 hours daily for 10 days Remove & Discard patch within 12 hours or as directed by MD, Disp: 10 patch, Rfl: 0    lisinopril (ZESTRIL) 10 mg tablet, TAKE 1 TABLET (10 MG TOTAL) BY MOUTH DAILY, Disp: 90 tablet, Rfl: 3    naproxen (EC NAPROSYN) 500 MG EC tablet, Take 1 tablet (500 mg total) by mouth 2 (two) times a day with meals, Disp: 30 tablet, Rfl: 0    predniSONE 20 mg tablet, Take 3 tablets (60 mg total) by mouth daily for 3 days, THEN 2 tablets (40 mg total) daily for 3 days, THEN 1 tablet (20 mg total) daily for 3 days., Disp: 18 tablet, Rfl: 0    Current Facility-Administered Medications:     ketorolac (TORADOL) injection 30 mg, 30 mg, Intramuscular, Once,     Current Allergies     Allergies as of 01/22/2025 - Reviewed 01/22/2025   Allergen Reaction Noted    Penicillins Anaphylaxis 09/05/2021    Aspirin Seizures 09/16/2024    Erythromycin Vomiting 01/29/2003    Salicylates Seizures 01/29/2003            The following portions of the patient's history were  reviewed and updated as appropriate: allergies, current medications, past family history, past medical history, past social history, past surgical history and problem list.     Past Medical History:   Diagnosis Date    Hypertension        History reviewed. No pertinent surgical history.    Family History   Problem Relation Age of Onset    Hypertension Mother     Heart disease Father     COPD Father          Medications have been verified.        Objective   /78   Pulse 101   Temp 98.2 °F (36.8 °C)   Resp 19   SpO2 97%        Physical Exam     Physical Exam  Vitals and nursing note reviewed.   Constitutional:       General: She is not in acute distress.     Appearance: Normal appearance. She is not ill-appearing.   HENT:      Head: Normocephalic and atraumatic.   Cardiovascular:      Rate and Rhythm: Normal rate and regular rhythm.      Heart sounds: Normal heart sounds, S1 normal and S2 normal.   Pulmonary:      Effort: Pulmonary effort is normal. No accessory muscle usage.      Breath sounds: Normal breath sounds. No wheezing.   Musculoskeletal:      Cervical back: Normal.      Thoracic back: Normal.      Lumbar back: Spasms and tenderness present.      Right hip: Normal.      Left hip: Tenderness present. No bony tenderness or crepitus.   Skin:     General: Skin is warm and dry.      Capillary Refill: Capillary refill takes less than 2 seconds.      Findings: No rash.   Neurological:      General: No focal deficit present.      Mental Status: She is alert and oriented to person, place, and time.      Motor: Motor function is intact.   Psychiatric:         Attention and Perception: Attention and perception normal.         Mood and Affect: Mood and affect normal.         Speech: Speech normal.         Behavior: Behavior normal. Behavior is cooperative.         Thought Content: Thought content normal.

## 2025-01-24 ENCOUNTER — HOSPITAL ENCOUNTER (EMERGENCY)
Facility: HOSPITAL | Age: 55
Discharge: HOME | End: 2025-01-24
Attending: EMERGENCY MEDICINE
Payer: COMMERCIAL

## 2025-01-24 ENCOUNTER — NURSE TRIAGE (OUTPATIENT)
Age: 55
End: 2025-01-24

## 2025-01-24 VITALS
HEART RATE: 95 BPM | SYSTOLIC BLOOD PRESSURE: 103 MMHG | RESPIRATION RATE: 22 BRPM | TEMPERATURE: 98.8 F | OXYGEN SATURATION: 99 % | DIASTOLIC BLOOD PRESSURE: 61 MMHG

## 2025-01-24 DIAGNOSIS — M54.16 LUMBAR RADICULOPATHY: Primary | ICD-10-CM

## 2025-01-24 PROCEDURE — 96374 THER/PROPH/DIAG INJ IV PUSH: CPT

## 2025-01-24 PROCEDURE — 99283 EMERGENCY DEPT VISIT LOW MDM: CPT

## 2025-01-24 PROCEDURE — 99284 EMERGENCY DEPT VISIT MOD MDM: CPT | Performed by: EMERGENCY MEDICINE

## 2025-01-24 RX ORDER — HYDROMORPHONE HCL/PF 1 MG/ML
0.5 SYRINGE (ML) INJECTION ONCE
Refills: 0 | Status: COMPLETED | OUTPATIENT
Start: 2025-01-24 | End: 2025-01-24

## 2025-01-24 RX ORDER — OXYCODONE AND ACETAMINOPHEN 5; 325 MG/1; MG/1
1 TABLET ORAL EVERY 6 HOURS PRN
Qty: 30 TABLET | Refills: 0 | Status: SHIPPED | OUTPATIENT
Start: 2025-01-24

## 2025-01-24 RX ADMIN — HYDROMORPHONE HYDROCHLORIDE 0.5 MG: 1 INJECTION, SOLUTION INTRAMUSCULAR; INTRAVENOUS; SUBCUTANEOUS at 11:18

## 2025-01-24 NOTE — TELEPHONE ENCOUNTER
"Pt warm transferred over by PEP.  States she was seen in  on Wednesday 1/22/25.  Pt states she injured herself that morning just leaning over to  a paper plate off the floor.  Pt was treated in  with Toradol injection, rx'd muscle relaxers, prednisone,lidocaine patches and utilized heating pads without relief.    Pt states pain is currently 10/10.  Pain is sharp and radiating now to back and knee.  Pt denies fevers. Pt states she can climb stairs to use the bathroom but will yell the whole way d/t pain.  Pt states she cannot lay down or sit or stand without pain. Advised pt to go to ED and to cancel her office appt with PCP today.  Pt asked to still keep appt with PCP in case she's waiting in the ED for a long time and isn't seen.      Pt is scheduled to see PCP today 1/24/25 at 2:20pm.      Reason for Disposition   Can't stand (bear weight) or walk    Answer Assessment - Initial Assessment Questions  1. LOCATION and RADIATION: \"Where is the pain located?\"       Left hip  2. QUALITY: \"What does the pain feel like?\"  (e.g., sharp, dull, aching, burning)      Sharp    3. SEVERITY: \"How bad is the pain?\" \"What does it keep you from doing?\"   (Scale 1-10; or mild, moderate, severe)      10/10    4. ONSET: \"When did the pain start?\" \"Does it come and go, or is it there all the time?\"      Wednesday morning  constant    5. WORK OR EXERCISE: \"Has there been any recent work or exercise that involved this part of the body?\"       Leaning over to  paper plate off table    6. CAUSE: \"What do you think is causing the hip pain?\"       Hip Strain    7. AGGRAVATING FACTORS: \"What makes the hip pain worse?\" (e.g., walking, climbing stairs, running)      Hard to climb stairs    8. OTHER SYMPTOMS: \"Do you have any other symptoms?\" (e.g., back pain, pain shooting down leg,  fever, rash)      Knee hurts and back hurts.    Protocols used: Hip Pain-Adult-[place]    "

## 2025-01-24 NOTE — ED PROVIDER NOTES
Time reflects when diagnosis was documented in both MDM as applicable and the Disposition within this note       Time User Action Codes Description Comment    1/24/2025 11:11 AM Ashwin Snow [M54.16] Lumbar radiculopathy           ED Disposition       ED Disposition   Discharge    Condition   Stable    Date/Time   Fri Jan 24, 2025 11:10 AM    Comment   Patricia Argueta discharge to home/self care.                   Assessment & Plan       Medical Decision Making  Risk  Prescription drug management.      Medical decision making 54-year-old female previously treated by PCP with a large regimen of pain medicines for her back.  Patient reports persistent pain despite using muscle relaxers lidocaine patches and anti-inflammatories and prednisone.  Discussed with patient difficult to add anything to that regimen but will add narcotic analgesics.  We discussed the risk benefit of narcotics we discussed treatment and follow-up we discussed [facility].  No indication for admission or urgent MRI here.       Medications   HYDROmorphone (DILAUDID) injection 0.5 mg (0.5 mg Intravenous Given 1/24/25 1118)       ED Risk Strat Scores        Patient meets Back pain low risk criteria, no trauma, no fever chills or weight loss, no neurological deficit, no history of cancer, no history of injecting drugs, pain improved with rest.         Improved after analgesics.                              History of Present Illness       Chief Complaint   Patient presents with    Hip Pain     Pt with left sided hip and thigh pain since Wednesday.  Pt was seen by PCP, given medication without relief.        Past Medical History:   Diagnosis Date    Hypertension       History reviewed. No pertinent surgical history.   Family History   Problem Relation Age of Onset    Hypertension Mother     Heart disease Father     COPD Father       Social History     Tobacco Use    Smoking status: Former     Current packs/day:  0.00     Average packs/day: 1 pack/day for 25.0 years (25.0 ttl pk-yrs)     Types: Cigarettes     Start date:      Quit date:      Years since quittin.0    Smokeless tobacco: Never   Substance Use Topics    Alcohol use: Yes    Drug use: Not Currently      E-Cigarette/Vaping      E-Cigarette/Vaping Substances      I have reviewed and agree with the history as documented.     HPI patient is a 54-year-old female she presents emergency department complaining of left-sided low back pain and left-sided leg pain.  Apparently seen Wednesday at primary care and apparently started on lidocaine patches prednisone Naprosyn and Flexeril without relief.  Patient describes a fairly sharp left-sided low back pain which radiates down her left leg into her left thigh anteriorly.  Pain denies any isabela numbness.  Denies any focal weakness.  The patient reports she has pain with any ambulation.  She reports the pain medicine is not working.  She was given an IM shot of Toradol at her primary care provider without relief.  Patient reports persistent pain and was advised to follow-up with the ER if worse.  Past medical history of hypertension  Family is noncontributory none social history, former smoker no history of drug abuse  Review of Systems   Constitutional:  Negative for fever.   HENT:  Negative for congestion.    Eyes:  Negative for pain and redness.   Respiratory:  Negative for cough and shortness of breath.    Cardiovascular:  Negative for chest pain.   Gastrointestinal:  Negative for abdominal pain and vomiting.   Musculoskeletal:  Positive for back pain.           Objective       ED Triage Vitals   Temperature Pulse Blood Pressure Respirations SpO2 Patient Position - Orthostatic VS   25 1015 25 1015 25 1015 25 1015 25 1015 25 1015   98.8 °F (37.1 °C) 95 103/61 22 99 % Sitting      Temp Source Heart Rate Source BP Location FiO2 (%) Pain Score    25 1015 25 1015  01/24/25 1015 -- 01/24/25 1118    Temporal Monitor Left arm  10 - Worst Possible Pain      Vitals      Date and Time Temp Pulse SpO2 Resp BP Pain Score FACES Pain Rating User   01/24/25 1118 -- -- -- -- -- 10 - Worst Possible Pain -- AM   01/24/25 1015 98.8 °F (37.1 °C) 95 99 % 22 103/61 -- -- AS            Physical Exam  Vitals and nursing note reviewed.   Constitutional:       Appearance: She is well-developed.   HENT:      Head: Normocephalic.      Right Ear: External ear normal.      Left Ear: External ear normal.      Nose: Nose normal.      Mouth/Throat:      Mouth: Mucous membranes are moist.      Pharynx: Oropharynx is clear.   Eyes:      General: Lids are normal.      Extraocular Movements: Extraocular movements intact.      Pupils: Pupils are equal, round, and reactive to light.   Cardiovascular:      Rate and Rhythm: Normal rate and regular rhythm.      Pulses: Normal pulses.      Heart sounds: Normal heart sounds.   Pulmonary:      Effort: Pulmonary effort is normal. No respiratory distress.   Musculoskeletal:         General: No deformity. Normal range of motion.      Cervical back: Normal range of motion and neck supple.      Comments: There is left-sided low back tenderness there is a positive left-sided straight leg raise, there is a positive contralateral straight leg, there is pain that radiates anteriorly in the anterior thigh consistent with L3-L4 distribution.  Good distal pulses and sensation less than 2 seconds capillary refill.   Skin:     General: Skin is warm and dry.   Neurological:      Mental Status: She is alert and oriented to person, place, and time.   Psychiatric:         Mood and Affect: Mood normal.         Results Reviewed       None            No orders to display       Procedures    ED Medication and Procedure Management   Prior to Admission Medications   Prescriptions Last Dose Informant Patient Reported? Taking?   cyclobenzaprine (FLEXERIL) 10 mg tablet   No No   Sig: Take 1  tablet (10 mg total) by mouth 3 (three) times a day as needed for muscle spasms   hydroCHLOROthiazide 25 mg tablet   No No   Sig: TAKE 1 TABLET (25 MG TOTAL) BY MOUTH DAILY   lidocaine (Lidoderm) 5 %   No No   Sig: Apply 1 patch topically over 12 hours daily for 10 days Remove & Discard patch within 12 hours or as directed by MD   lisinopril (ZESTRIL) 10 mg tablet   No No   Sig: TAKE 1 TABLET (10 MG TOTAL) BY MOUTH DAILY   naproxen (EC NAPROSYN) 500 MG EC tablet   No No   Sig: Take 1 tablet (500 mg total) by mouth 2 (two) times a day with meals   predniSONE 20 mg tablet   No No   Sig: Take 3 tablets (60 mg total) by mouth daily for 3 days, THEN 2 tablets (40 mg total) daily for 3 days, THEN 1 tablet (20 mg total) daily for 3 days.      Facility-Administered Medications: None     Discharge Medication List as of 1/24/2025 11:12 AM        START taking these medications    Details   oxyCODONE-acetaminophen (PERCOCET) 5-325 mg per tablet Take 1 tablet by mouth every 6 (six) hours as needed for moderate pain or severe pain for up to 30 doses Max Daily Amount: 4 tablets, Starting Fri 1/24/2025, Normal           CONTINUE these medications which have NOT CHANGED    Details   cyclobenzaprine (FLEXERIL) 10 mg tablet Take 1 tablet (10 mg total) by mouth 3 (three) times a day as needed for muscle spasms, Starting Wed 1/22/2025, Normal      hydroCHLOROthiazide 25 mg tablet TAKE 1 TABLET (25 MG TOTAL) BY MOUTH DAILY, Starting Wed 12/18/2024, Normal      lidocaine (Lidoderm) 5 % Apply 1 patch topically over 12 hours daily for 10 days Remove & Discard patch within 12 hours or as directed by MD, Starting Wed 1/22/2025, Until Sat 2/1/2025, Normal      lisinopril (ZESTRIL) 10 mg tablet TAKE 1 TABLET (10 MG TOTAL) BY MOUTH DAILY, Starting Fri 11/22/2024, Normal      naproxen (EC NAPROSYN) 500 MG EC tablet Take 1 tablet (500 mg total) by mouth 2 (two) times a day with meals, Starting Wed 1/22/2025, Normal      predniSONE 20 mg tablet  Multiple Dosages:Starting Wed 1/22/2025, Until Fri 1/24/2025 at 2359, THEN Starting Sat 1/25/2025, Until Mon 1/27/2025 at 2359, THEN Starting Tue 1/28/2025, Until Thu 1/30/2025 at 2359Take 3 tablets (60 mg total) by mouth daily for 3 days, THEN 2 tab lets (40 mg total) daily for 3 days, THEN 1 tablet (20 mg total) daily for 3 days., Normal             ED SEPSIS DOCUMENTATION   Time reflects when diagnosis was documented in both MDM as applicable and the Disposition within this note       Time User Action Codes Description Comment    1/24/2025 11:11 AM Ashwin Snow Add [M54.16] Lumbar radiculopathy                  Ashwin Snow MD  01/24/25 6782

## 2025-01-24 NOTE — DISCHARGE INSTRUCTIONS
Your exam is consistent with a pinched nerve in your left low back.  I believe you have a herniated disc probably L3-L4.  Rest  Continue the other medications as prescribed  Percocet 1 every 6 hours as needed for pain caution narcotic will make you sleepy  The [facility] will call you for follow-up.  Return worsening symptoms increasing pain, inability to move or urinate or any other issues

## 2025-01-27 ENCOUNTER — NURSE TRIAGE (OUTPATIENT)
Dept: PHYSICAL THERAPY | Facility: OTHER | Age: 55
End: 2025-01-27

## 2025-01-27 DIAGNOSIS — M54.42 ACUTE LEFT-SIDED LOW BACK PAIN WITH LEFT-SIDED SCIATICA: Primary | ICD-10-CM

## 2025-01-27 NOTE — TELEPHONE ENCOUNTER
Additional Information   Negative: Is this related to a work injury?   Negative: Is this related to an MVA?   Negative: Are you currently recieving homecare services?   Negative: Has the patient had unexplained weight loss?   Negative: Does the patient have a fever?   Negative: Is the patient experiencing blood in sputum?   Negative: Is this a chronic condition?   Negative: Is the patient experiencing urine retention?   Negative: Is the patient experiencing acute drop foot or paralysis?   Negative: Has the patient experienced major trauma? (fall from height, high speed collision, direct blow to spine) and is also experiencing nausea, light-headedness, or loss of consciousness?    Background - Initial Assessment  Clinical complaint: Left lower back pain which radiates to the hip and leg. Numbness and tingling of the left leg. Started 1/22/25 with [facility]. States no history of back issues.  Date of onset: 1/22/25  Frequency of pain: constant  Quality of pain: aching with stabbing pain when moving.    Protocols used: [facility] Protocol    [facility] was reviewed in detail and what we can provide for their back pain.  Patient is agreeable to being triaged and would like to proceed with Physical Therapy.    Referral was placed for Physical Therapy at the [place] site. Patients information was sent to the  to make evaluation appointment. Patient made aware that the PT office  will be calling to schedule the appointment.  Patient was provided with the phone number to the PT office.    No further questions and/or concerns were voiced by the patient at this time. Patient states understanding of the referral that was placed.    Referral Closed.

## 2025-01-29 ENCOUNTER — EVALUATION (OUTPATIENT)
Dept: PHYSICAL THERAPY | Facility: CLINIC | Age: 55
End: 2025-01-29
Payer: COMMERCIAL

## 2025-01-29 DIAGNOSIS — M54.42 ACUTE LEFT-SIDED LOW BACK PAIN WITH LEFT-SIDED SCIATICA: Primary | ICD-10-CM

## 2025-01-29 PROCEDURE — 97163 PT EVAL HIGH COMPLEX 45 MIN: CPT | Performed by: PHYSICAL THERAPIST

## 2025-01-29 PROCEDURE — 97110 THERAPEUTIC EXERCISES: CPT | Performed by: PHYSICAL THERAPIST

## 2025-01-29 NOTE — PROGRESS NOTES
PT Evaluation     Today's date: 2025  Patient name: Patricia Argueta  : 1970  MRN: [id number]  Referring provider: Luz Chau MD  Dx:   Encounter Diagnosis     ICD-10-CM    1. Acute left-sided low back pain with left-sided sciatica  M54.42                      Assessment  Impairments: abnormal gait, abnormal muscle firing, abnormal or restricted ROM, abnormal movement, activity intolerance, impaired physical strength, lacks appropriate home exercise program, pain with function, weight-bearing intolerance and poor posture   Symptom irritability: high    Assessment details: Pt's evaluation is inconclusive today.  She is in 10/10 pain and is hypersensitive despite having been on a long list of medications/ injections and having the incident be a week ago.  Modalities and palliative treatments were not even tolerated.  PT will attempt to re-assess next visit.  Understanding of Dx/Px/POC: fair     Prognosis: fair    Goals  ST) Pt. Will have centralized radicular symptoms in 6 weeks.  2) Pt. Will be able to sit as long as desired without pain in 6 weeks.  3)  Pt. Will be able to return to work under light/ modified duty in 6 weeks.  4)  Pt. Will be able to perform all routine chores at home without pain or limitations in 6 weeks.  5) Pt. Will be able to perform all LE dressing tasks without limitations.  LT) Pt. Will be karol to stand as long as desired without pain in 12 weeks.  2) Pt. Will be able to return to full time full duty work in 12 weeks.  3)  Pt. Will be able to perform all heavy activity at home, such as garbage, moving furniture, carrying weighted items on all surfaces without pain or limitation in 12 weeks.      Plan  Patient would benefit from: skilled physical therapy and PT eval  Referral necessary: No  Planned modality interventions: cryotherapy, thermotherapy: hydrocollator packs and unattended electrical stimulation    Planned therapy interventions: manual therapy,  neuromuscular re-education, patient/caregiver education, self care, therapeutic activities, therapeutic exercise and home exercise program    Frequency: 3x week  Duration in weeks: 4  Treatment plan discussed with: patient        Subjective Evaluation    History of Present Illness  Mechanism of injury: Pt reports severe pain in her left low back, left hip and radiating down to her left shin.  Pt reports having sudden and sharp pain in her left hip after partially leaning over the table to  a paper plate.          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  Pt is unable to do that at this time.    Limited in (transfers and gait).    Pt can only get somewhat comfortable in right sidelying with a pillow between her knee.Treatments  Previous treatment: medication  Current treatment: medication      Objective     Palpation     Additional Palpation Details  Hypersensitivity to left L/S and gluteal area    Active Range of Motion     Additional Active Range of Motion Details  Unable to assess due to patient being in too much pain to move from her fixed posture    Ambulation     Ambulation: Level Surfaces     Additional Level Surfaces Ambulation Details  Pt ambulates around 5 feet with fixed trunk flexion posture as well as flexed left hip and knee w/ ankle plantarflexion    General Comments:      Lumbar Comments  Extreme pain and  hypersensitivity along with anxiety not allowing for any significant testing during evaluation today.                Precautions : low pain tolerance     Access Code: [email or web address]     Manuals 1-29-25                                       Neuro Re-Ed                                                                 Ther Ex                                                        Pt Ed/ HEP Pathology and involved anatomy as well as issuing and reviewing HEP-15'               Ther Activity                        Gait Training                        Modalities

## 2025-01-31 ENCOUNTER — OFFICE VISIT (OUTPATIENT)
Dept: PHYSICAL THERAPY | Facility: CLINIC | Age: 55
End: 2025-01-31
Payer: COMMERCIAL

## 2025-01-31 DIAGNOSIS — M54.42 ACUTE LEFT-SIDED LOW BACK PAIN WITH LEFT-SIDED SCIATICA: Primary | ICD-10-CM

## 2025-01-31 PROCEDURE — 97140 MANUAL THERAPY 1/> REGIONS: CPT | Performed by: PHYSICAL THERAPIST

## 2025-01-31 PROCEDURE — 97110 THERAPEUTIC EXERCISES: CPT | Performed by: PHYSICAL THERAPIST

## 2025-01-31 PROCEDURE — 97014 ELECTRIC STIMULATION THERAPY: CPT | Performed by: PHYSICAL THERAPIST

## 2025-01-31 PROCEDURE — G0283 ELEC STIM OTHER THAN WOUND: HCPCS | Performed by: PHYSICAL THERAPIST

## 2025-01-31 NOTE — PROGRESS NOTES
Daily Note     Today's date: 2025  Patient name: Patricia Argueta  : 1970  MRN: [id number]  Referring provider: Luz Chau MD  Dx:   Encounter Diagnosis     ICD-10-CM    1. Acute left-sided low back pain with left-sided sciatica  M54.42                      Subjective: Pt reports extremely slight improvements in pain tolerance      Objective: See treatment diary below      Assessment: Tolerated treatment poor. Patient was able to tolerate a few nerve flossing and trunk rotations as well as gentle hip PROM before symptom flareup stopped session.      Plan: attempt to progress next week as able        Precautions : low pain tolerance     Access Code: [email or web address]     Manuals 25        PROM left hip  10'                              Neuro Re-Ed                                                                 Ther Ex          Sciatic flossing 4'        LTR 2x10                                       Pt Ed/ HEP Pathology and involved anatomy as well as issuing and reviewing HEP-15'               Ther Activity                        Gait Training                        Modalities          IF stim w/ posterior heat and cold pack for groin12'

## 2025-02-07 ENCOUNTER — OFFICE VISIT (OUTPATIENT)
Dept: PHYSICAL THERAPY | Facility: CLINIC | Age: 55
End: 2025-02-07
Payer: COMMERCIAL

## 2025-02-07 DIAGNOSIS — M54.42 ACUTE LEFT-SIDED LOW BACK PAIN WITH LEFT-SIDED SCIATICA: Primary | ICD-10-CM

## 2025-02-07 PROCEDURE — 97140 MANUAL THERAPY 1/> REGIONS: CPT | Performed by: PHYSICAL THERAPIST

## 2025-02-07 PROCEDURE — 97110 THERAPEUTIC EXERCISES: CPT | Performed by: PHYSICAL THERAPIST

## 2025-02-07 NOTE — PROGRESS NOTES
"Daily Note     Today's date: 2025  Patient name: Patricia Argueta  : 1970  MRN: [id number]  Referring provider: Luz Chau MD  Dx:   Encounter Diagnosis     ICD-10-CM    1. Acute left-sided low back pain with left-sided sciatica  M54.42                      Subjective: Pt reports feeling a little better...being able to sit, walk, move and sleep better.  Still appears in high pain anxiety state with increased respiratory rates.      Objective: See treatment diary below      Assessment: Tolerated treatment fairly well. Patient able to tolerate a moderate amount of activities today and this should be able to be increased next visit.      Plan: Continue per plan of care.  Progress treatment as tolerated.       Precautions : low pain tolerance     Access Code: [email or web address]     Manuals 25       PROM left hip  10' 20'                             Neuro Re-Ed                                                                 Ther Ex          Sciatic flossing 4' 4'       LTR 2x10  10\" x12        PPT 5\" x15                             Pt Ed/ HEP Pathology and involved anatomy as well as issuing and reviewing HEP-15'               Ther Activity                        Gait Training                        Modalities          IF stim w/ posterior heat and cold pack for groin12'                         "

## 2025-02-14 ENCOUNTER — OFFICE VISIT (OUTPATIENT)
Dept: PHYSICAL THERAPY | Facility: CLINIC | Age: 55
End: 2025-02-14
Payer: COMMERCIAL

## 2025-02-14 DIAGNOSIS — M54.42 ACUTE LEFT-SIDED LOW BACK PAIN WITH LEFT-SIDED SCIATICA: Primary | ICD-10-CM

## 2025-02-14 PROCEDURE — 97140 MANUAL THERAPY 1/> REGIONS: CPT | Performed by: PHYSICAL THERAPIST

## 2025-02-14 PROCEDURE — 97014 ELECTRIC STIMULATION THERAPY: CPT | Performed by: PHYSICAL THERAPIST

## 2025-02-14 PROCEDURE — G0283 ELEC STIM OTHER THAN WOUND: HCPCS | Performed by: PHYSICAL THERAPIST

## 2025-02-14 PROCEDURE — 97110 THERAPEUTIC EXERCISES: CPT | Performed by: PHYSICAL THERAPIST

## 2025-02-14 NOTE — PROGRESS NOTES
"Daily Note     Today's date: 2025  Patient name: Patricia Argueta  : 1970  MRN: [id number]  Referring provider: Luz Chau MD  Dx:   Encounter Diagnosis     ICD-10-CM    1. Acute left-sided low back pain with left-sided sciatica  M54.42                      Subjective: Pt reports being a little better.  She is able to stand up straighter and walk further/ better      Objective: See treatment diary below      Assessment: Tolerated treatment fair. Patient able to get through more of program with most uncomfortable part being extended repetitions of supine hamstring stretches      Plan: Continue per plan of care.  Progress treatment as tolerated.       Precautions : low pain tolerance     Access Code: [email or web address]     Manuals 25      PROM left hip  10' 20' 20' including long axis traction                            Neuro Re-Ed             Isometric abdominals 10\" x15                                                    Ther Ex          Sciatic flossing 4' 4' 4'      LTR 2x10  10\" x12 10\" x12       PPT 5\" x15 10\" 2x10        Supine hamstring stretch 10\" 12x        Butterfly groin stretch 10\" x12                                            Pt Ed/ HEP Pathology and involved anatomy as well as issuing and reviewing HEP-15'               Ther Activity                        Gait Training                        Modalities          IF stim w/ posterior heat and cold pack for groin12'                           "

## 2025-02-21 ENCOUNTER — OFFICE VISIT (OUTPATIENT)
Dept: PHYSICAL THERAPY | Facility: CLINIC | Age: 55
End: 2025-02-21
Payer: COMMERCIAL

## 2025-02-21 DIAGNOSIS — M54.42 ACUTE LEFT-SIDED LOW BACK PAIN WITH LEFT-SIDED SCIATICA: Primary | ICD-10-CM

## 2025-02-21 PROCEDURE — 97140 MANUAL THERAPY 1/> REGIONS: CPT | Performed by: PHYSICAL THERAPIST

## 2025-02-21 PROCEDURE — 97110 THERAPEUTIC EXERCISES: CPT | Performed by: PHYSICAL THERAPIST

## 2025-02-21 NOTE — PROGRESS NOTES
"Daily Note     Today's date: 2025  Patient name: Patricia Argueta  : 1970  MRN: [id number]  Referring provider: Luz Chau MD  Dx:   Encounter Diagnosis     ICD-10-CM    1. Acute left-sided low back pain with left-sided sciatica  M54.42                      Subjective: Pt reports she continues to do better and now her pain is a dull ache, but fairly consistent.      Objective: See treatment diary below      Assessment: Tolerated treatment well. Patient continues progression in symptom reduction and improved gait and mobility.      Plan: Continue per plan of care.  Progress treatment as tolerated.       Precautions : low pain tolerance     Access Code: [email or web address]     Manuals 25     PROM left hip  10' 20' 20' including long axis traction 20'                           Neuro Re-Ed             Isometric abdominals 10\" x15 10\" x15                                                   Ther Ex          Sciatic flossing 4' 4' 4' 4'     LTR 2x10  10\" x12 10\" x12 10\" x12      PPT 5\" x15 10\" 2x10 10\" 2x10       Supine hamstring stretch 10\" 12x 10\" x12       Butterfly groin stretch 10\" x12 10\" x12                                           Pt Ed/ HEP Pathology and involved anatomy as well as issuing and reviewing HEP-15'               Ther Activity                        Gait Training                        Modalities          IF stim w/ posterior heat and cold pack for groin12'   15'                          "

## 2025-02-28 ENCOUNTER — OFFICE VISIT (OUTPATIENT)
Dept: PHYSICAL THERAPY | Facility: CLINIC | Age: 55
End: 2025-02-28
Payer: COMMERCIAL

## 2025-02-28 DIAGNOSIS — M54.42 ACUTE LEFT-SIDED LOW BACK PAIN WITH LEFT-SIDED SCIATICA: Primary | ICD-10-CM

## 2025-02-28 PROCEDURE — 97014 ELECTRIC STIMULATION THERAPY: CPT | Performed by: PHYSICAL THERAPIST

## 2025-02-28 PROCEDURE — 97140 MANUAL THERAPY 1/> REGIONS: CPT | Performed by: PHYSICAL THERAPIST

## 2025-02-28 PROCEDURE — 97110 THERAPEUTIC EXERCISES: CPT | Performed by: PHYSICAL THERAPIST

## 2025-02-28 PROCEDURE — G0283 ELEC STIM OTHER THAN WOUND: HCPCS | Performed by: PHYSICAL THERAPIST

## 2025-02-28 NOTE — PROGRESS NOTES
"Daily Note     Today's date: 2025  Patient name: Patricia Argueta  : 1970  MRN: [id number]  Referring provider: Luz Chau MD  Dx:   Encounter Diagnosis     ICD-10-CM    1. Acute left-sided low back pain with left-sided sciatica  M54.42                      Subjective: Pt reports she continues to feel a little better.  She is able to pick her grandson up now       Objective: See treatment diary below      Assessment: Tolerated treatment well. Patient still can't sit perfectly in a chair and prolonged sitting remains most limited activity.  Pt is moving better in transfers and gait.  Significant functional improvement as noted from updated FOTO score.      Plan: Continue per plan of care.  Progress treatment as tolerated.       Precautions : low pain tolerance     Access Code: [email or web address]     Manuals 25     PROM left hip  10' 20' 20' including long axis traction 20'                           Neuro Re-Ed          10\" x20   Isometric abdominals 10\" x15 10\" x15                                                   Ther Ex         4' sciatic flossing Sciatic flossing 4' 4' 4' 4'    LTR 10\" x12 LTR 2x10  10\" x12 10\" x12 10\" x12    PPT 10\" 2x10  PPT 5\" x15 10\" 2x10 10\" 2x10    10\" x12 independently   Supine hamstring stretch 10\" 12x 10\" x12    10\" x12  Butterfly groin stretch   Butterfly groin stretch 10\" x12 10\" x12                                           Pt Ed/ HEP Pathology and involved anatomy as well as issuing and reviewing HEP-15'               Ther Activity                        Gait Training                        Modalities         IF and MHP  15' IF stim w/ posterior heat and cold pack for groin12'   15'                            "

## 2025-03-07 ENCOUNTER — OFFICE VISIT (OUTPATIENT)
Dept: PHYSICAL THERAPY | Facility: CLINIC | Age: 55
End: 2025-03-07
Payer: COMMERCIAL

## 2025-03-07 DIAGNOSIS — M54.42 ACUTE LEFT-SIDED LOW BACK PAIN WITH LEFT-SIDED SCIATICA: Primary | ICD-10-CM

## 2025-03-07 PROCEDURE — 97014 ELECTRIC STIMULATION THERAPY: CPT | Performed by: PHYSICAL THERAPIST

## 2025-03-07 PROCEDURE — 97112 NEUROMUSCULAR REEDUCATION: CPT | Performed by: PHYSICAL THERAPIST

## 2025-03-07 PROCEDURE — G0283 ELEC STIM OTHER THAN WOUND: HCPCS | Performed by: PHYSICAL THERAPIST

## 2025-03-07 PROCEDURE — 97110 THERAPEUTIC EXERCISES: CPT | Performed by: PHYSICAL THERAPIST

## 2025-03-07 NOTE — PROGRESS NOTES
"Daily Note     Today's date: 3/7/2025  Patient name: Patricia Argueta  : 1970  MRN: [id number]  Referring provider: Luz Chau MD  Dx:   Encounter Diagnosis     ICD-10-CM    1. Acute left-sided low back pain with left-sided sciatica  M54.42                      Subjective: Pt reports continuing to feel better as well as being able to tolerate driving for 15 minutes before some symptoms returned.      Objective: See treatment diary below      Assessment: Tolerated treatment well. Patient would continue to benefit from progression of dynamic core stabilization.      Plan: Progress with improvements in dynamic core stabilization.     Precautions : low pain tolerance     Access Code: [email or web address]     Manuals 2-28-25 3-7-25 2-7-25 2-14-25 2-21-25      20' 20' including long axis traction 20'                           Neuro Re-Ed          10\" x20 Iso Abs w/ ball  Isometric abdominals 10\" x15 10\" x15     Bridges w/ iso abs and hip abduction  2x10                                              Ther Ex         4' sciatic flossing Sciatic flossing 4' 4' 4' 4'    LTR 10\" x12 LTR 2x10  10\" x12 10\" x12 10\" x12    PPT 10\" 2x10 10\" 2x10 PPT 5\" x15 10\" 2x10 10\" 2x10    10\" x12 independently 10\" x12  Supine hamstring stretch 10\" 12x 10\" x12    10\" x12  Butterfly groin stretch 10\" x12  Butterfly groin stretch 10\" x12 10\" x12                                           Pt Ed/ HEP Pathology and involved anatomy as well as issuing and reviewing HEP-15'               Ther Activity                        Gait Training                        Modalities         IF and MHP  15' IF stim w/ posterior heat and cold pack for groin12'   15'                              "

## 2025-03-14 ENCOUNTER — OFFICE VISIT (OUTPATIENT)
Dept: PHYSICAL THERAPY | Facility: CLINIC | Age: 55
End: 2025-03-14
Payer: COMMERCIAL

## 2025-03-14 DIAGNOSIS — M54.42 ACUTE LEFT-SIDED LOW BACK PAIN WITH LEFT-SIDED SCIATICA: Primary | ICD-10-CM

## 2025-03-14 PROCEDURE — 97014 ELECTRIC STIMULATION THERAPY: CPT | Performed by: PHYSICAL THERAPIST

## 2025-03-14 PROCEDURE — 97112 NEUROMUSCULAR REEDUCATION: CPT | Performed by: PHYSICAL THERAPIST

## 2025-03-14 PROCEDURE — 97110 THERAPEUTIC EXERCISES: CPT | Performed by: PHYSICAL THERAPIST

## 2025-03-14 PROCEDURE — G0283 ELEC STIM OTHER THAN WOUND: HCPCS | Performed by: PHYSICAL THERAPIST

## 2025-03-14 NOTE — PROGRESS NOTES
"Daily Note     Today's date: 3/14/2025  Patient name: Patricia Argueta  : 1970  MRN: [id number]  Referring provider: Luz Chau MD  Dx:   Encounter Diagnosis     ICD-10-CM    1. Acute left-sided low back pain with left-sided sciatica  M54.42                      Subjective: Pt reports continuing to feel improvements and is able now to drive without issues.      Objective: See treatment diary below      Assessment: Tolerated treatment well. Patient showed no limitations or impacts from pain today.  Would benefit from progressing to more dynamic core strengthening activities.      Plan: Continue per plan of care.  Progress treatment as tolerated.       Precautions : low pain tolerance     Access Code: [email or web address]     Manuals 2-28-25 3-7-25 3-14-25 2-14-25 2-21-25      20' 20' including long axis traction 20'                           Neuro Re-Ed          10\" x20 Iso Abs w/ ball 20\" x20 Isometric abdominals 10\" x15 10\" x15     Bridges w/ iso abs and hip abduction  2x10 25x blue                                             Ther Ex         4' sciatic flossing Sciatic flossing 4' 4' 4' 4'    LTR 10\" x12 LTR 2x10  10\" x12 10\" x12 10\" x12    PPT 10\" 2x10 10\" 2x10 PPT 5\" x15 10\" 2x10 10\" 2x10    10\" x12 independently 10\" x12 10\" x12 Supine hamstring stretch 10\" 12x 10\" x12    10\" x12  Butterfly groin stretch 10\" x12 10\" x12 Butterfly groin stretch 10\" x12 10\" x12                                           Pt Ed/ HEP Pathology and involved anatomy as well as issuing and reviewing HEP-15'               Ther Activity                        Gait Training                        Modalities         IF and MHP  15' IF stim w/ posterior heat and cold pack for groin12' 15'  15'                                "

## 2025-03-21 ENCOUNTER — OFFICE VISIT (OUTPATIENT)
Dept: PHYSICAL THERAPY | Facility: CLINIC | Age: 55
End: 2025-03-21
Payer: COMMERCIAL

## 2025-03-21 DIAGNOSIS — M54.42 ACUTE LEFT-SIDED LOW BACK PAIN WITH LEFT-SIDED SCIATICA: Primary | ICD-10-CM

## 2025-03-21 PROCEDURE — 97110 THERAPEUTIC EXERCISES: CPT | Performed by: PHYSICAL THERAPIST

## 2025-03-21 PROCEDURE — 97112 NEUROMUSCULAR REEDUCATION: CPT | Performed by: PHYSICAL THERAPIST

## 2025-03-21 NOTE — PROGRESS NOTES
"Daily Note     Today's date: 3/21/2025  Patient name: Patricia Argueta  : 1970  MRN: [id number]  Referring provider: Luz Chau MD  Dx:   Encounter Diagnosis     ICD-10-CM    1. Acute left-sided low back pain with left-sided sciatica  M54.42                      Subjective: Continuing to improve      Objective: See treatment diary below      Assessment: Tolerated treatment well. Patient demonstrated fatigue post treatment, exhibited good technique with therapeutic exercises, and would benefit from continued PT      Plan: Continue per plan of care.  Progress treatment as tolerated.       Precautions : low pain tolerance     Access Code: [email or web address]     Manuals 2-28-25 3-7-25 3-14-25 3-21-25 2-21-25      20' 20' including long axis traction 20'                           Neuro Re-Ed          10\" x20 Iso Abs w/ ball 20\" x20 Isometric abdominals 10\" x15 10\" x15     Bridges w/ iso abs and hip abduction  2x10 25x blue 25x  blue        Palloff press  2x10 yemi green                                    Ther Ex         4' sciatic flossing Sciatic flossing 4' 4' 4' 4'    LTR 10\" x12 LTR 2x10  10\" x12 10\" x12 10\" x12    PPT 10\" 2x10 10\" 2x10 PPT 5\" x15 10\" 2x10 10\" 2x10    10\" x12 independently 10\" x12 10\" x12 Supine hamstring stretch 10\" 12x 10\" x12    10\" x12  Butterfly groin stretch 10\" x12 10\" x12 Butterfly groin stretch 10\" x12 10\" x12                                           Pt Ed/ HEP Pathology and involved anatomy as well as issuing and reviewing HEP-15'               Ther Activity                        Gait Training                        Modalities         IF and MHP  15' IF stim w/ posterior heat and cold pack for groin12' 15' 15' 15'                                  "

## 2025-03-28 ENCOUNTER — OFFICE VISIT (OUTPATIENT)
Dept: PHYSICAL THERAPY | Facility: CLINIC | Age: 55
End: 2025-03-28
Payer: COMMERCIAL

## 2025-03-28 DIAGNOSIS — M54.42 ACUTE LEFT-SIDED LOW BACK PAIN WITH LEFT-SIDED SCIATICA: Primary | ICD-10-CM

## 2025-03-28 PROCEDURE — 97112 NEUROMUSCULAR REEDUCATION: CPT | Performed by: PHYSICAL THERAPIST

## 2025-03-28 PROCEDURE — 97110 THERAPEUTIC EXERCISES: CPT | Performed by: PHYSICAL THERAPIST

## 2025-03-28 NOTE — PROGRESS NOTES
"Daily Note     Today's date: 3/28/2025  Patient name: Patricia Argueta  : 1970  MRN: [id number]  Referring provider: Luz Chau MD  Dx:   Encounter Diagnosis     ICD-10-CM    1. Acute left-sided low back pain with left-sided sciatica  M54.42                      Subjective: Pt reports continued improvement in pain and ability at home.  She is now able to drive as long as she would like.      Objective: See treatment diary below      Assessment: Tolerated treatment well. Patient progressing in pain free mobility and dynamic functional strength.      Plan: continue to progress dynamic core stabilization and start discharge planning for 1-2 more visits.     Precautions : low pain tolerance     Access Code: [email or web address]     Manuals 2-28-25 3-7-25 3-14-25 3-21-25 3-28-25      20' 20' including long axis traction                            Neuro Re-Ed          10\" x20 Iso Abs w/ ball 20\" x20 Isometric abdominals 10\" x15 10\" x15     Bridges w/ iso abs and hip abduction  2x10 25x blue 25x  blue 30x blue w/ iso abs       Palloff press  2x10 yemi green 2x10 yemi blue        Forward walks 8' 7x bilat blue                           Ther Ex         4' sciatic flossing Sciatic flossing 4' 4' 4' 4'    LTR 10\" x12 LTR 2x10  10\" x12 10\" x12 10\" x12    PPT 10\" 2x10 10\" 2x10 PPT 5\" x15 10\" 2x10 10\" 2x10    10\" x12 independently 10\" x12 10\" x12 Supine hamstring stretch 10\" 12x 10\" x12    10\" x12  Butterfly groin stretch 10\" x12 10\" x12 Butterfly groin stretch 10\" x12 10\" x12        Nu Step 12' L4                                   Pt Ed/ HEP Pathology and involved anatomy as well as issuing and reviewing HEP-15'               Ther Activity                        Gait Training                        Modalities         IF and MHP  15' IF stim w/ posterior heat and cold pack for groin12' 15' 15' 15'                                    "

## 2025-04-05 DIAGNOSIS — I10 PRIMARY HYPERTENSION: ICD-10-CM

## 2025-04-07 ENCOUNTER — OFFICE VISIT (OUTPATIENT)
Dept: PHYSICAL THERAPY | Facility: CLINIC | Age: 55
End: 2025-04-07
Payer: COMMERCIAL

## 2025-04-07 DIAGNOSIS — Z00.00 ANNUAL PHYSICAL EXAM: ICD-10-CM

## 2025-04-07 DIAGNOSIS — Z11.4 SCREENING FOR HIV (HUMAN IMMUNODEFICIENCY VIRUS): ICD-10-CM

## 2025-04-07 DIAGNOSIS — R73.03 PREDIABETES: Primary | ICD-10-CM

## 2025-04-07 DIAGNOSIS — E78.2 MIXED HYPERLIPIDEMIA: ICD-10-CM

## 2025-04-07 DIAGNOSIS — Z11.59 NEED FOR HEPATITIS C SCREENING TEST: ICD-10-CM

## 2025-04-07 DIAGNOSIS — Z12.11 SCREEN FOR COLON CANCER: ICD-10-CM

## 2025-04-07 DIAGNOSIS — M54.42 ACUTE LEFT-SIDED LOW BACK PAIN WITH LEFT-SIDED SCIATICA: Primary | ICD-10-CM

## 2025-04-07 PROCEDURE — 97110 THERAPEUTIC EXERCISES: CPT | Performed by: PHYSICAL THERAPIST

## 2025-04-07 PROCEDURE — 97112 NEUROMUSCULAR REEDUCATION: CPT | Performed by: PHYSICAL THERAPIST

## 2025-04-07 RX ORDER — HYDROCHLOROTHIAZIDE 25 MG/1
25 TABLET ORAL DAILY
Qty: 30 TABLET | Refills: 0 | Status: SHIPPED | OUTPATIENT
Start: 2025-04-07

## 2025-04-07 NOTE — PROGRESS NOTES
"Daily Note     Today's date: 2025  Patient name: Patricia Argueta  : 1970  MRN: [id number]  Referring provider: Luz Chau MD  Dx:   Encounter Diagnosis     ICD-10-CM    1. Acute left-sided low back pain with left-sided sciatica  M54.42                      Subjective: Pt reports continuing to feel better.      Objective: See treatment diary below      Assessment: Tolerated treatment well. Patient continues to show functional improvements with no pain increases.  Would benefit from progression of functional and dynamic core strengthening for 1-2 more visits.      Plan: Continue per plan of care.  Progress treatment as tolerated.       Precautions : low pain tolerance     Access Code: [email or web address]     Manuals 4-7-25 3-7-25 3-14-25 3-21-25 3-28-25      20 20 including long axis traction                            Neuro Re-Ed         Iso abs w/ marching x20 Iso Abs w/ ball 20\" x20 Isometric abdominals 10\" x15 10\" x15   Bridging w/ iso abs and hip ab 2x10 Bridges w/ iso abs and hip abduction  2x10 25x blue 25x  blue 30x blue w/ iso abs   Palloff press  Blue 2x10 yemi   Palloff press  2x10 yemi green 2x10 yemi blue   Forward walking Blue 8' 10x    Forward walks 8' 7x bilat blue                           Ther Ex         4' sciatic flossing Sciatic flossing 4' 4' 4' 4'    LTR 10\" x12 LTR 2x10  10\" x12 10\" x12 10\" x12    PPT 10\" 2x10 10\" 2x10 PPT 5\" x15 10\" 2x10 10\" 2x10    10\" x12 independently 10\" x12 10\" x12 Supine hamstring stretch 10\" 12x 10\" x12    10\" x12  Butterfly groin stretch 10\" x12 10\" x12 Butterfly groin stretch 10\" x12 10\" x12    Nu Step 12' L5    Nu Step 12' L4    IT band strap stretch 10\" x12                               Pt Ed/ HEP Pathology and involved anatomy as well as issuing and reviewing HEP-15'               Ther Activity                        Gait Training                        Modalities         IF and MHP  15' IF stim w/ posterior heat and cold pack for groin12' 15' 15' 15'    "

## 2025-04-07 NOTE — TELEPHONE ENCOUNTER
Left message on machine to call back. Medication approved, labs need to be completed, physical not due until September 2025

## 2025-04-11 ENCOUNTER — OFFICE VISIT (OUTPATIENT)
Dept: PHYSICAL THERAPY | Facility: CLINIC | Age: 55
End: 2025-04-11
Payer: COMMERCIAL

## 2025-04-11 DIAGNOSIS — M54.42 ACUTE LEFT-SIDED LOW BACK PAIN WITH LEFT-SIDED SCIATICA: Primary | ICD-10-CM

## 2025-04-11 PROCEDURE — 97112 NEUROMUSCULAR REEDUCATION: CPT | Performed by: PHYSICAL THERAPIST

## 2025-04-11 PROCEDURE — 97110 THERAPEUTIC EXERCISES: CPT | Performed by: PHYSICAL THERAPIST

## 2025-04-11 NOTE — PROGRESS NOTES
"Daily Note     Today's date: 2025  Patient name: Patricia Argueta  : 1970  MRN: [id number]  Referring provider: Luz Chau MD  Dx:   Encounter Diagnosis     ICD-10-CM    1. Acute left-sided low back pain with left-sided sciatica  M54.42                      Subjective: Pt reports continued improvement in symptoms and ability to do activity.      Objective: See treatment diary below      Assessment: Tolerated treatment well. Patient showed no difficulty progressing with dynamic core strengthening.      Plan: progress towards discharge to [facility] in near future.     Precautions : low pain tolerance     Access Code: [email or web address]     Manuals 4-7-25 4-11-25 3-14-25 3-21-25 3-28-25      20' 20' including long axis traction                            Neuro Re-Ed         Iso abs w/ marching x20 Iso Abs w/ ball 20\" x20 Isometric abdominals 10\" x15 10\" x15   Bridging w/ iso abs and hip ab 2x10 Bridges w/ iso abs and hip abduction  2x10 25x blue 25x  blue 30x blue w/ iso abs   Palloff press  Blue 2x10 yemi Blue 2x10  Palloff press  2x10 yemi green 2x10 yemi blue   Forward walking Blue 8' 10x Blue 8' 10x bilat   Forward walks 8' 7x bilat blue     Wall ball squats w/ iso abdominals                      Ther Ex         4' sciatic flossing Sciatic flossing 4' 4' 4' 4'    LTR 10\" x12 LTR 2x10  10\" x12 10\" x12 10\" x12    PPT 10\" 2x10 10\" 2x10 PPT 5\" x15 10\" 2x10 10\" 2x10    10\" x12 independently 10\" x12 10\" x12 Supine hamstring stretch 10\" 12x 10\" x12    10\" x12  Butterfly groin stretch 10\" x12 10\" x12 Butterfly groin stretch 10\" x12 10\" x12    Nu Step 12' L5 12' L5   Nu Step 12' L4    IT band strap stretch 10\" x12 10\" x12                              Pt Ed/ HEP Pathology and involved anatomy as well as issuing and reviewing HEP-15'               Ther Activity                        Gait Training                        Modalities         IF and MHP  15' MHP l/s area 15' 15' 15' 15'                                        "

## 2025-04-17 ENCOUNTER — OFFICE VISIT (OUTPATIENT)
Dept: PHYSICAL THERAPY | Facility: CLINIC | Age: 55
End: 2025-04-17
Attending: FAMILY MEDICINE
Payer: COMMERCIAL

## 2025-04-17 DIAGNOSIS — M54.42 ACUTE LEFT-SIDED LOW BACK PAIN WITH LEFT-SIDED SCIATICA: Primary | ICD-10-CM

## 2025-04-17 PROCEDURE — 97112 NEUROMUSCULAR REEDUCATION: CPT | Performed by: PHYSICAL THERAPIST

## 2025-04-17 NOTE — PROGRESS NOTES
"Daily Note     Today's date: 2025  Patient name: Patricia Argueta  : 1970  MRN: [id number]  Referring provider: Luz Chau MD  Dx:   Encounter Diagnosis     ICD-10-CM    1. Acute left-sided low back pain with left-sided sciatica  M54.42                      Subjective: pt reports she is still feeling very good and feels this could be her last day.      Objective: See treatment diary below      Assessment: Tolerated treatment well. Patient has no more complaints and appears ready for discharge.      Plan: Continue per plan of care.  Progress treatment as tolerated.       Precautions : low pain tolerance     Access Code: [email or web address]     Manuals 4-7-25 4-11-25 4-17-25 3-21-25 3-28-25       20' including long axis traction                            Neuro Re-Ed         Iso abs w/ marching x20 Iso Abs w/ ball 20\" x20 Isometric abdominals 10\" x15 10\" x15   Bridging w/ iso abs and hip ab 2x10 Bridges w/ iso abs and hip abduction  2x10 25x blue 25x  blue 30x blue w/ iso abs   Palloff press  Blue 2x10 yemi Blue 2x10 Black 2x15 bilat Palloff press  2x10 yemi green 2x10 yemi blue   Forward walking Blue 8' 10x Blue 8' 10x bilat 8' Black 10x bilat  Forward walks 8' 7x bilat blue     Wall ball squats w/ iso abdominals 2x10        Goblet and suitcase carries 6# 25' 4x each             Ther Ex         4' sciatic flossing Sciatic flossing 4' 4' 4' 4'    LTR 10\" x12 LTR 2x10  10\" x12 10\" x12 10\" x12    PPT 10\" 2x10 10\" 2x10 PPT 5\" x15 10\" 2x10 10\" 2x10    10\" x12 independently 10\" x12 10\" x12 Supine hamstring stretch 10\" 12x 10\" x12    10\" x12  Butterfly groin stretch 10\" x12 10\" x12 Butterfly groin stretch 10\" x12 10\" x12    Nu Step 12' L5 12' L5 L6 12'10\" x12  Nu Step 12' L4    IT band strap stretch 10\" x12 10\" x12                              Pt Ed/ HEP Pathology and involved anatomy as well as issuing and reviewing HEP-15'               Ther Activity                        Gait Training                      "   Modalities         IF and MHP  15' MHP l/s area 15' 15' 15' 15'

## 2025-05-21 DIAGNOSIS — I10 PRIMARY HYPERTENSION: ICD-10-CM

## 2025-05-22 RX ORDER — HYDROCHLOROTHIAZIDE 25 MG/1
25 TABLET ORAL DAILY
Qty: 30 TABLET | Refills: 0 | Status: SHIPPED | OUTPATIENT
Start: 2025-05-22

## 2025-06-17 DIAGNOSIS — I10 PRIMARY HYPERTENSION: ICD-10-CM

## 2025-06-17 RX ORDER — HYDROCHLOROTHIAZIDE 25 MG/1
25 TABLET ORAL DAILY
Qty: 30 TABLET | Refills: 0 | Status: SHIPPED | OUTPATIENT
Start: 2025-06-17

## 2025-07-14 DIAGNOSIS — I10 PRIMARY HYPERTENSION: ICD-10-CM

## 2025-07-14 RX ORDER — HYDROCHLOROTHIAZIDE 25 MG/1
25 TABLET ORAL DAILY
Qty: 30 TABLET | Refills: 0 | Status: SHIPPED | OUTPATIENT
Start: 2025-07-14

## 2025-08-13 DIAGNOSIS — I10 PRIMARY HYPERTENSION: ICD-10-CM

## 2025-08-18 RX ORDER — HYDROCHLOROTHIAZIDE 25 MG/1
25 TABLET ORAL DAILY
Qty: 30 TABLET | Refills: 0 | Status: SHIPPED | OUTPATIENT
Start: 2025-08-18